# Patient Record
Sex: FEMALE | Race: WHITE | NOT HISPANIC OR LATINO | Employment: OTHER | URBAN - METROPOLITAN AREA
[De-identification: names, ages, dates, MRNs, and addresses within clinical notes are randomized per-mention and may not be internally consistent; named-entity substitution may affect disease eponyms.]

---

## 2017-01-25 ENCOUNTER — HISTORICAL (OUTPATIENT)
Dept: ADMINISTRATIVE | Facility: HOSPITAL | Age: 52
End: 2017-01-25

## 2017-01-25 LAB
B-HCG UR QL: NEGATIVE
BASOPHILS NFR BLD: 0 K/UL (ref 0–0.2)
BASOPHILS NFR BLD: 0.3 %
BUN SERPL-MCNC: 23 MG/DL (ref 8–20)
CALCIUM SERPL-MCNC: 9.2 MG/DL (ref 7.7–10.4)
CHLORIDE: 99 MMOL/L (ref 98–110)
CO2 SERPL-SCNC: 30.4 MMOL/L (ref 22.8–31.6)
CREATININE: 0.74 MG/DL (ref 0.6–1.4)
EOSINOPHIL NFR BLD: 0.1 K/UL (ref 0–0.7)
EOSINOPHIL NFR BLD: 1.1 %
ERYTHROCYTE [DISTWIDTH] IN BLOOD BY AUTOMATED COUNT: 13.7 % (ref 11.7–14.9)
GLUCOSE: 102 MG/DL (ref 70–99)
GRAN #: 5.7 K/UL (ref 1.4–6.5)
GRAN%: 54.6 %
HCT VFR BLD AUTO: 36.6 % (ref 36–48)
HGB BLD-MCNC: 12.1 G/DL (ref 12–15)
IMMATURE GRANS (ABS): 0.1 K/UL (ref 0–1)
IMMATURE GRANULOCYTES: 0.5 %
LYMPH #: 3.8 K/UL (ref 1.2–3.4)
LYMPH%: 36.2 %
MCH RBC QN AUTO: 26.9 PG (ref 25–35)
MCHC RBC AUTO-ENTMCNC: 33.1 G/DL (ref 31–36)
MCV RBC AUTO: 81.3 FL (ref 79–98)
MONO #: 0.8 K/UL (ref 0.1–0.6)
MONO%: 7.3 %
NUCLEATED RBCS: 0 %
PLATELET # BLD AUTO: 383 K/UL (ref 140–440)
PMV BLD AUTO: 9.9 FL (ref 8.8–12.7)
POTASSIUM SERPL-SCNC: 3.1 MMOL/L (ref 3.5–5)
RBC # BLD AUTO: 4.5 M/UL (ref 3.5–5.5)
SODIUM: 139 MMOL/L (ref 134–144)
WBC # BLD AUTO: 10.5 K/UL (ref 5–10)

## 2017-02-13 LAB
CALCIUM SERPL-MCNC: 8.7 MG/DL (ref 7.7–10.4)
GLUCOSE SERPL-MCNC: 106 MG/DL (ref 70–99)
GLUCOSE SERPL-MCNC: 114 MG/DL (ref 70–99)
GLUCOSE SERPL-MCNC: 129 MG/DL (ref 70–99)

## 2017-02-14 LAB
CALCIUM SERPL-MCNC: 8.5 MG/DL (ref 7.7–10.4)
GLUCOSE SERPL-MCNC: 106 MG/DL (ref 70–99)
GLUCOSE SERPL-MCNC: 91 MG/DL (ref 70–99)

## 2018-12-23 ENCOUNTER — CLINICAL SUPPORT (OUTPATIENT)
Dept: URGENT CARE | Facility: CLINIC | Age: 53
End: 2018-12-23
Payer: MEDICAID

## 2018-12-23 VITALS
OXYGEN SATURATION: 94 % | BODY MASS INDEX: 47.88 KG/M2 | WEIGHT: 253.63 LBS | TEMPERATURE: 98 F | HEIGHT: 61 IN | HEART RATE: 64 BPM

## 2018-12-23 DIAGNOSIS — J32.9 SINUSITIS, UNSPECIFIED CHRONICITY, UNSPECIFIED LOCATION: ICD-10-CM

## 2018-12-23 DIAGNOSIS — J40 BRONCHITIS: Primary | ICD-10-CM

## 2018-12-23 DIAGNOSIS — R05.9 COUGH: ICD-10-CM

## 2018-12-23 PROCEDURE — 99204 OFFICE O/P NEW MOD 45 MIN: CPT | Mod: 25,S$GLB,, | Performed by: NURSE PRACTITIONER

## 2018-12-23 RX ORDER — LOVASTATIN 10 MG/1
10 TABLET ORAL NIGHTLY
COMMUNITY
End: 2024-02-27

## 2018-12-23 RX ORDER — BENZONATATE 100 MG/1
200 CAPSULE ORAL 3 TIMES DAILY PRN
Qty: 30 CAPSULE | Refills: 1 | Status: SHIPPED | OUTPATIENT
Start: 2018-12-23 | End: 2019-12-23

## 2018-12-23 RX ORDER — OMEPRAZOLE 40 MG/1
40 CAPSULE, DELAYED RELEASE ORAL DAILY
COMMUNITY
End: 2024-02-27 | Stop reason: SDUPTHER

## 2018-12-23 RX ORDER — NAPROXEN SODIUM 220 MG/1
81 TABLET, FILM COATED ORAL DAILY
COMMUNITY

## 2018-12-23 RX ORDER — MONTELUKAST SODIUM 10 MG/1
10 TABLET ORAL NIGHTLY
COMMUNITY
End: 2024-02-27

## 2018-12-23 RX ORDER — DEXAMETHASONE SODIUM PHOSPHATE 4 MG/ML
8 INJECTION, SOLUTION INTRA-ARTICULAR; INTRALESIONAL; INTRAMUSCULAR; INTRAVENOUS; SOFT TISSUE
Status: COMPLETED | OUTPATIENT
Start: 2018-12-23 | End: 2018-12-23

## 2018-12-23 RX ORDER — FUROSEMIDE 40 MG/1
40 TABLET ORAL 2 TIMES DAILY
COMMUNITY
End: 2024-02-27 | Stop reason: SDUPTHER

## 2018-12-23 RX ORDER — LEVOTHYROXINE SODIUM 112 UG/1
200 TABLET ORAL DAILY
COMMUNITY
End: 2024-02-27 | Stop reason: SDUPTHER

## 2018-12-23 RX ORDER — AZITHROMYCIN 250 MG/1
TABLET, FILM COATED ORAL
Qty: 6 TABLET | Refills: 0 | Status: SHIPPED | OUTPATIENT
Start: 2018-12-23 | End: 2024-02-27

## 2018-12-23 RX ORDER — METOPROLOL TARTRATE 50 MG/1
50 TABLET ORAL 2 TIMES DAILY
COMMUNITY
End: 2024-02-27 | Stop reason: SDUPTHER

## 2018-12-23 RX ORDER — SERTRALINE HYDROCHLORIDE 100 MG/1
100 TABLET, FILM COATED ORAL DAILY
COMMUNITY
End: 2024-02-27 | Stop reason: SDUPTHER

## 2018-12-23 RX ORDER — ERGOCALCIFEROL 1.25 MG/1
50000 CAPSULE ORAL
COMMUNITY

## 2018-12-23 RX ORDER — POTASSIUM CHLORIDE 750 MG/1
10 CAPSULE, EXTENDED RELEASE ORAL ONCE
COMMUNITY
End: 2024-02-27

## 2018-12-23 RX ADMIN — DEXAMETHASONE SODIUM PHOSPHATE 8 MG: 4 INJECTION, SOLUTION INTRA-ARTICULAR; INTRALESIONAL; INTRAMUSCULAR; INTRAVENOUS; SOFT TISSUE at 11:12

## 2018-12-23 NOTE — PROGRESS NOTES
"Subjective:       Patient ID: Vania Montano is a 53 y.o. female.    Vitals:  height is 5' 1" (1.549 m) and weight is 115 kg (253 lb 9.6 oz). Her temperature is 97.7 °F (36.5 °C). Her pulse is 64. Her oxygen saturation is 94 (abnormal)%.     Chief Complaint: Cough    Pt presents with cough with yellow sputum production x 3 days. She denies sob or cp. She also reports PND and nasal congestion,      Cough   This is a recurrent problem. The current episode started in the past 7 days. The problem has been unchanged. The problem occurs constantly. The cough is productive of sputum. Associated symptoms include a sore throat. Pertinent negatives include no chills, ear pain, eye redness, fever, hemoptysis, myalgias, rash, shortness of breath or wheezing.       Constitution: Negative for chills, sweating, fatigue and fever.   HENT: Positive for sinus pain, sinus pressure and sore throat. Negative for ear pain, congestion and voice change.    Neck: Negative for painful lymph nodes.   Eyes: Negative for eye redness.   Respiratory: Positive for cough and sputum production. Negative for chest tightness, bloody sputum, COPD, shortness of breath, stridor, wheezing and asthma.    Gastrointestinal: Negative for nausea and vomiting.   Musculoskeletal: Negative for muscle ache.   Skin: Negative for rash.   Allergic/Immunologic: Negative for seasonal allergies and asthma.   Hematologic/Lymphatic: Negative for swollen lymph nodes.       Objective:      Physical Exam   Constitutional: She is oriented to person, place, and time. She appears well-developed and well-nourished. She is cooperative.  Non-toxic appearance. She does not appear ill. No distress.   HENT:   Head: Normocephalic and atraumatic.   Right Ear: Hearing, tympanic membrane, external ear and ear canal normal.   Left Ear: Hearing, tympanic membrane, external ear and ear canal normal.   Nose: Rhinorrhea present. No mucosal edema or nasal deformity. No epistaxis. Right sinus " exhibits no maxillary sinus tenderness and no frontal sinus tenderness. Left sinus exhibits no maxillary sinus tenderness and no frontal sinus tenderness.   Mouth/Throat: Uvula is midline, oropharynx is clear and moist and mucous membranes are normal. No trismus in the jaw. Normal dentition. No uvula swelling. No posterior oropharyngeal erythema.   Eyes: Conjunctivae and lids are normal. No scleral icterus.   Sclera clear bilat   Neck: Trachea normal, full passive range of motion without pain and phonation normal. Neck supple.   Cardiovascular: Normal rate, regular rhythm, normal heart sounds, intact distal pulses and normal pulses.   Pulmonary/Chest: Effort normal and breath sounds normal. No respiratory distress.   Course BS to bases   Abdominal: Soft. Normal appearance and bowel sounds are normal. She exhibits no distension. There is no tenderness.   Musculoskeletal: Normal range of motion. She exhibits no edema or deformity.   Neurological: She is alert and oriented to person, place, and time. She exhibits normal muscle tone. Coordination normal.   Skin: Skin is warm, dry and intact. She is not diaphoretic. No pallor.   Psychiatric: She has a normal mood and affect. Her speech is normal and behavior is normal. Judgment and thought content normal. Cognition and memory are normal.   Nursing note and vitals reviewed.      Assessment:       1. Bronchitis    2. Cough    3. Sinusitis, unspecified chronicity, unspecified location        Plan:         Bronchitis    Cough    Sinusitis, unspecified chronicity, unspecified location    Other orders  -     dexamethasone injection 8 mg  -     azithromycin (Z-MADIE) 250 MG tablet; Take 2 tablets by mouth on day 1; Take 1 tablet by mouth on days 2-5  Dispense: 6 tablet; Refill: 0  -     benzonatate (TESSALON PERLES) 100 MG capsule; Take 2 capsules (200 mg total) by mouth 3 (three) times daily as needed for Cough.  Dispense: 30 capsule; Refill: 1

## 2018-12-23 NOTE — PATIENT INSTRUCTIONS
What Is Acute Bronchitis?  Acute bronchitis is when the airways in your lungs (bronchial tubes) become red and swollen (inflamed). It is usually caused by a viral infection. But it can also occur because of a bacteria or allergen. Symptoms include a cough that produces yellow or greenish mucus and can last for days or sometimes weeks.  Inside healthy lungs    Air travels in and out of the lungs through the airways. The linings of these airways produce sticky mucus. This mucus traps particles that enter the lungs. Tiny structures called cilia then sweep the particles out of the airways.     Healthy airway: Airways are normally open. Air moves in and out easily.      Healthy cilia: Tiny, hairlike cilia sweep mucus and particles up and out of the airways.   Lungs with bronchitis  Bronchitis often occurs with a cold or the flu virus. The airways become inflamed (red and swollen). There is a deep hacking cough from the extra mucus. Other symptoms may include:  · Wheezing  · Chest discomfort  · Shortness of breath  · Mild fever  A second infection, this time due to bacteria, may then occur. And airways irritated by allergens or smoke are more likely to get infected.        Inflamed airway: Inflammation and extra mucus narrow the airway, causing shortness of breath.      Impaired cilia: Extra mucus impairs cilia, causing congestion and wheezing. Smoking makes the problem worse.   Making a diagnosis  A physical exam, health history, and certain tests help your healthcare provider make the diagnosis.  Health history  Your healthcare provider will ask you about your symptoms.  The exam  Your provider listens to your chest for signs of congestion. He or she may also check your ears, nose, and throat.  Possible tests  · A sputum test for bacteria. This requires a sample of mucus from your lungs.  · A nasal or throat swab. This tests to see if you have a bacterial infection.  · A chest X-ray. This is done if your healthcare  provider thinks you have pneumonia.  · Tests to check for an underlying condition. Other tests may be done to check for things such as allergies, asthma, or COPD (chronic obstructive pulmonary disease). You may need to see a specialist for more lung function testing.  Treating a cough  The main treatment for bronchitis is easing symptoms. Avoiding smoke, allergens, and other things that trigger coughing can often help. If the infection is bacterial, you may be given antibiotics. During the illness, it's important to get plenty of sleep. To ease symptoms:  · Dont smoke. Also avoid secondhand smoke.  · Use a humidifier. Or try breathing in steam from a hot shower. This may help loosen mucus.  · Drink a lot of water and juice. They can soothe the throat and may help thin mucus.  · Sit up or use extra pillows when in bed. This helps to lessen coughing and congestion.  · Ask your provider about using medicine. Ask about using cough medicine, pain and fever medicine, or a decongestant.  Antibiotics  Most cases of bronchitis are caused by cold or flu viruses. They dont need antibiotics to treat them, even if your mucus is thick and green or yellow. Antibiotics dont treat viral illness and antibiotics have not been shown to have any benefit in cases of acute bronchitis. Taking antibiotics when they are not needed increases your risk of getting an infection later that is antibiotic-resistant. Antibiotics can also cause severe cases of diarrhea that require other antibiotics to treat.  It is important that you accept your healthcare provider's opinion to not use antibiotics. Your provider will prescribe antibiotics if the infection is caused by bacteria. If they are prescribed:  · Take all of the medicine. Take the medicine until it is used up, even if symptoms have improved. If you dont, the bronchitis may come back.  · Take the medicines as directed. For instance, some medicines should be taken with food.  · Ask about  side effects. Ask your provider or pharmacist what side effects are common, and what to do about them.  Follow-up care  You should see your provider again in 2 to 3 weeks. By this time, symptoms should have improved. An infection that lasts longer may mean you have a more serious problem.  Prevention  · Avoid tobacco smoke. If you smoke, quit. Stay away from smoky places. Ask friends and family not to smoke around you, or in your home or car.  · Get checked for allergies.  · Ask your provider about getting a yearly flu shot. Also ask about pneumococcal or pneumonia shots.  · Wash your hands often. This helps reduce the chance of picking up viruses that cause colds and flu.  Call your healthcare provider if:  · Symptoms worsen, or you have new symptoms  · Breathing problems worsen or  become severe  · Symptoms dont get better within a week, or within 3 days of taking antibiotics   Date Last Reviewed: 2/1/2017  © 3870-2470 [employer]. [address] [place] [address]. All rights reserved. This information is not intended as a substitute for professional medical care. Always follow your healthcare professional's instructions.        Sinusitis (Antibiotic Treatment)    The sinuses are air-filled spaces within the bones of the face. They connect to the inside of the nose. Sinusitis is an inflammation of the tissue lining the sinus cavity. Sinus inflammation can occur during a cold. It can also be due to allergies to pollens and other particles in the air. Sinusitis can cause symptoms of sinus congestion and fullness. A sinus infection causes fever, headache and facial pain. There is often green or yellow drainage from the nose or into the back of the throat (post-nasal drip). You have been given antibiotics to treat this condition.  Home care:  · Take the full course of antibiotics as instructed. Do not stop taking them, even if you feel better.  · Drink plenty of water, hot tea, and other  liquids. This may help thin mucus. It also may promote sinus drainage.  · Heat may help soothe painful areas of the face. Use a towel soaked in hot water. Or,  the shower and direct the hot spray onto your face. Using a vaporizer along with a menthol rub at night may also help.   · An expectorant containing guaifenesin may help thin the mucus and promote drainage from the sinuses.  · Over-the-counter decongestants may be used unless a similar medicine was prescribed. Nasal sprays work the fastest. Use one that contains phenylephrine or oxymetazoline. First blow the nose gently. Then use the spray. Do not use these medicines more often than directed on the label or symptoms may get worse. You may also use tablets containing pseudoephedrine. Avoid products that combine ingredients, because side effects may be increased. Read labels. You can also ask the pharmacist for help. (NOTE: Persons with high blood pressure should not use decongestants. They can raise blood pressure.)  · Over-the-counter antihistamines may help if allergies contributed to your sinusitis.    · Do not use nasal rinses or irrigation during an acute sinus infection, unless told to by your health care provider. Rinsing may spread the infection to other sinuses.  · Use acetaminophen or ibuprofen to control pain, unless another pain medicine was prescribed. (If you have chronic liver or kidney disease or ever had a stomach ulcer, talk with your doctor before using these medicines. Aspirin should never be used in anyone under 18 years of age who is ill with a fever. It may cause severe liver damage.)  · Don't smoke. This can worsen symptoms.  Follow-up care  Follow up with your healthcare provider or our staff if you are not improving within the next week.  When to seek medical advice  Call your healthcare provider if any of these occur:  · Facial pain or headache becoming more severe  · Stiff neck  · Unusual drowsiness or confusion  · Swelling  of the forehead or eyelids  · Vision problems, including blurred or double vision  · Fever of 100.4ºF (38ºC) or higher, or as directed by your healthcare provider  · Seizure  · Breathing problems  · Symptoms not resolving within 10 days  Date Last Reviewed: 4/13/2015  © [address]. [address] [place] [address], [place] [address]. All rights reserved. This information is not intended as a substitute for professional medical care. Always follow your healthcare professional's instructions.

## 2018-12-28 ENCOUNTER — OFFICE VISIT (OUTPATIENT)
Dept: URGENT CARE | Facility: CLINIC | Age: 53
End: 2018-12-28
Payer: MEDICAID

## 2018-12-28 VITALS
WEIGHT: 248 LBS | HEIGHT: 61 IN | RESPIRATION RATE: 12 BRPM | DIASTOLIC BLOOD PRESSURE: 83 MMHG | TEMPERATURE: 99 F | OXYGEN SATURATION: 98 % | BODY MASS INDEX: 46.82 KG/M2 | SYSTOLIC BLOOD PRESSURE: 137 MMHG | HEART RATE: 63 BPM

## 2018-12-28 DIAGNOSIS — J40 BRONCHITIS: Primary | ICD-10-CM

## 2018-12-28 PROCEDURE — 99214 OFFICE O/P EST MOD 30 MIN: CPT | Mod: S$GLB,,, | Performed by: NURSE PRACTITIONER

## 2018-12-28 RX ORDER — ALBUTEROL SULFATE 0.83 MG/ML
2.5 SOLUTION RESPIRATORY (INHALATION) EVERY 6 HOURS PRN
Qty: 1 BOX | Refills: 0 | Status: SHIPPED | OUTPATIENT
Start: 2018-12-28 | End: 2019-12-28

## 2018-12-28 RX ORDER — PREDNISONE 20 MG/1
20 TABLET ORAL 2 TIMES DAILY
Qty: 10 TABLET | Refills: 0 | Status: SHIPPED | OUTPATIENT
Start: 2018-12-28 | End: 2019-01-02

## 2018-12-28 RX ORDER — FLUTICASONE PROPIONATE 50 MCG
2 SPRAY, SUSPENSION (ML) NASAL 2 TIMES DAILY
Qty: 1 BOTTLE | Refills: 0 | Status: SHIPPED | OUTPATIENT
Start: 2018-12-28 | End: 2024-02-27

## 2018-12-28 RX ORDER — CETIRIZINE HYDROCHLORIDE 10 MG/1
10 TABLET ORAL DAILY
Qty: 30 TABLET | Refills: 2 | Status: SHIPPED | OUTPATIENT
Start: 2018-12-28 | End: 2024-02-27

## 2018-12-28 RX ORDER — ALBUTEROL SULFATE 90 UG/1
2 AEROSOL, METERED RESPIRATORY (INHALATION) EVERY 6 HOURS PRN
Qty: 18 G | Refills: 0 | Status: SHIPPED | OUTPATIENT
Start: 2018-12-28 | End: 2024-02-27 | Stop reason: SDUPTHER

## 2018-12-28 RX ORDER — PROMETHAZINE HYDROCHLORIDE AND DEXTROMETHORPHAN HYDROBROMIDE 6.25; 15 MG/5ML; MG/5ML
5 SYRUP ORAL EVERY 4 HOURS PRN
Qty: 240 ML | Refills: 0 | Status: SHIPPED | OUTPATIENT
Start: 2018-12-28 | End: 2019-01-07

## 2018-12-28 NOTE — PROGRESS NOTES
"Subjective:       Patient ID: Vania Montano is a 53 y.o. female.    Vitals:  height is 5' 1" (1.549 m) and weight is 112.5 kg (248 lb). Her temperature is 99.3 °F (37.4 °C). Her blood pressure is 137/83 and her pulse is 63. Her respiration is 12 and oxygen saturation is 98%.     Chief Complaint: Follow-up    Seen on 12/23/18 states not any better cough is worse. Was placed on antibiotics and given decadron injection then, states feels she is getting worse. Denies fever/chills    Cough   This is a new problem. The current episode started 1 to 4 weeks ago. The problem has been gradually worsening. The problem occurs every few minutes. The cough is non-productive. Associated symptoms include postnasal drip, a sore throat and wheezing. Pertinent negatives include no chest pain, chills, fever, headaches, myalgias, rash or shortness of breath. Nothing aggravates the symptoms. She has tried OTC cough suppressant and prescription cough suppressant for the symptoms. The treatment provided no relief.       Constitution: Positive for fatigue. Negative for chills and fever.   HENT: Positive for congestion, postnasal drip and sore throat.    Neck: Negative for painful lymph nodes.   Cardiovascular: Negative for chest pain and leg swelling.   Eyes: Negative for double vision and blurred vision.   Respiratory: Positive for cough and wheezing. Negative for shortness of breath.    Gastrointestinal: Negative for nausea, vomiting and diarrhea.   Genitourinary: Negative for dysuria, frequency, urgency and history of kidney stones.   Musculoskeletal: Negative for joint pain, joint swelling, muscle cramps and muscle ache.   Skin: Negative for color change, pale, rash and bruising.   Allergic/Immunologic: Negative for seasonal allergies.   Neurological: Negative for dizziness, history of vertigo, light-headedness, passing out and headaches.   Hematologic/Lymphatic: Negative for swollen lymph nodes.   Psychiatric/Behavioral: Negative for " nervous/anxious, sleep disturbance and depression. The patient is not nervous/anxious.        Objective:      Physical Exam   Constitutional: She is oriented to person, place, and time. Vital signs are normal. She appears well-developed and well-nourished. She is cooperative.   HENT:   Head: Normocephalic.   Right Ear: Hearing, external ear and ear canal normal. A middle ear effusion is present.   Left Ear: Hearing, external ear and ear canal normal. A middle ear effusion is present.   Nose: Mucosal edema and rhinorrhea present. Right sinus exhibits maxillary sinus tenderness. Left sinus exhibits maxillary sinus tenderness.   Mouth/Throat: Uvula is midline and mucous membranes are normal. Posterior oropharyngeal erythema present.   Eyes: Conjunctivae, EOM and lids are normal. Pupils are equal, round, and reactive to light.   Neck: Trachea normal, normal range of motion, full passive range of motion without pain and phonation normal. Neck supple.   Cardiovascular: Normal rate, regular rhythm, normal heart sounds, intact distal pulses and normal pulses.   Pulmonary/Chest: Effort normal and breath sounds normal.   Abdominal: Soft. Normal appearance, normal aorta and bowel sounds are normal. There is no tenderness.   Musculoskeletal: Normal range of motion.   Neurological: She is alert and oriented to person, place, and time. She has normal strength. GCS eye subscore is 4. GCS verbal subscore is 5. GCS motor subscore is 6.   Skin: Skin is warm, dry and intact. Capillary refill takes less than 2 seconds.   Psychiatric: She has a normal mood and affect. Her speech is normal and behavior is normal. Judgment and thought content normal. Cognition and memory are normal.       Assessment:       1. Bronchitis        Plan:     will augment previous treatment with albuterol per bronchitis guidelines as well as symptomatic care. Pt requested more antibiotics but do not feel she will need them at this time based on presentation and  appearance of no distress    Bronchitis  -     NEBULIZER FOR HOME USE  -     predniSONE (DELTASONE) 20 MG tablet; Take 1 tablet (20 mg total) by mouth 2 (two) times daily. for 5 days  Dispense: 10 tablet; Refill: 0  -     promethazine-dextromethorphan (PROMETHAZINE-DM) 6.25-15 mg/5 mL Syrp; Take 5 mLs by mouth every 4 (four) hours as needed.  Dispense: 240 mL; Refill: 0  -     albuterol (PROVENTIL) 2.5 mg /3 mL (0.083 %) nebulizer solution; Take 3 mLs (2.5 mg total) by nebulization every 6 (six) hours as needed for Wheezing. Rescue  Dispense: 1 Box; Refill: 0  -     albuterol (VENTOLIN HFA) 90 mcg/actuation inhaler; Inhale 2 puffs into the lungs every 6 (six) hours as needed for Wheezing. Rescue  Dispense: 18 g; Refill: 0  -     cetirizine (ZYRTEC) 10 MG tablet; Take 1 tablet (10 mg total) by mouth once daily.  Dispense: 30 tablet; Refill: 2  -     fluticasone (FLONASE) 50 mcg/actuation nasal spray; 2 sprays (100 mcg total) by Each Nare route 2 (two) times daily.  Dispense: 1 Bottle; Refill: 0  -     NEBULIZER KIT (SUPPLIES) FOR HOME USE

## 2019-12-26 ENCOUNTER — CLINICAL SUPPORT (OUTPATIENT)
Dept: URGENT CARE | Facility: CLINIC | Age: 54
End: 2019-12-26
Payer: MEDICAID

## 2019-12-26 VITALS
HEART RATE: 63 BPM | OXYGEN SATURATION: 95 % | RESPIRATION RATE: 18 BRPM | TEMPERATURE: 97 F | BODY MASS INDEX: 42.49 KG/M2 | DIASTOLIC BLOOD PRESSURE: 67 MMHG | HEIGHT: 65 IN | WEIGHT: 255 LBS | SYSTOLIC BLOOD PRESSURE: 133 MMHG

## 2019-12-26 DIAGNOSIS — J40 BRONCHITIS: ICD-10-CM

## 2019-12-26 DIAGNOSIS — J32.9 SINUSITIS, UNSPECIFIED CHRONICITY, UNSPECIFIED LOCATION: Primary | ICD-10-CM

## 2019-12-26 PROCEDURE — 99214 OFFICE O/P EST MOD 30 MIN: CPT | Mod: 25,S$GLB,, | Performed by: EMERGENCY MEDICINE

## 2019-12-26 PROCEDURE — 99214 PR OFFICE/OUTPT VISIT, EST, LEVL IV, 30-39 MIN: ICD-10-PCS | Mod: 25,S$GLB,, | Performed by: EMERGENCY MEDICINE

## 2019-12-26 RX ORDER — BENZONATATE 100 MG/1
200 CAPSULE ORAL 3 TIMES DAILY PRN
Qty: 30 CAPSULE | Refills: 1 | Status: SHIPPED | OUTPATIENT
Start: 2019-12-26 | End: 2020-01-25

## 2019-12-26 RX ORDER — CEFTRIAXONE 1 G/1
1 INJECTION, POWDER, FOR SOLUTION INTRAMUSCULAR; INTRAVENOUS
Status: COMPLETED | OUTPATIENT
Start: 2019-12-26 | End: 2019-12-26

## 2019-12-26 RX ORDER — DEXAMETHASONE SODIUM PHOSPHATE 4 MG/ML
8 INJECTION, SOLUTION INTRA-ARTICULAR; INTRALESIONAL; INTRAMUSCULAR; INTRAVENOUS; SOFT TISSUE ONCE
Status: COMPLETED | OUTPATIENT
Start: 2019-12-26 | End: 2019-12-26

## 2019-12-26 RX ORDER — AMOXICILLIN AND CLAVULANATE POTASSIUM 875; 125 MG/1; MG/1
1 TABLET, FILM COATED ORAL 2 TIMES DAILY
Qty: 14 TABLET | Refills: 0 | Status: SHIPPED | OUTPATIENT
Start: 2019-12-26 | End: 2024-02-27

## 2019-12-26 RX ADMIN — CEFTRIAXONE 1 G: 1 INJECTION, POWDER, FOR SOLUTION INTRAMUSCULAR; INTRAVENOUS at 01:12

## 2019-12-26 RX ADMIN — DEXAMETHASONE SODIUM PHOSPHATE 8 MG: 4 INJECTION, SOLUTION INTRA-ARTICULAR; INTRALESIONAL; INTRAMUSCULAR; INTRAVENOUS; SOFT TISSUE at 01:12

## 2019-12-26 NOTE — PROGRESS NOTES
"Subjective:       Patient ID: Vania Montano is a 54 y.o. female.    Vitals:  height is 5' 5" (1.651 m) and weight is 115.7 kg (255 lb). Her temperature is 97.4 °F (36.3 °C). Her blood pressure is 133/67 and her pulse is 63. Her respiration is 18 and oxygen saturation is 95%.     Chief Complaint: Sinus Problem    Sinus Problem   Episode onset: sunday  Associated symptoms include congestion, coughing, sinus pressure and a sore throat. (Scratchy throat ) Treatments tried: otc cough med. The treatment provided no relief.       HENT: Positive for congestion, postnasal drip, sinus pain, sinus pressure and sore throat.    Respiratory: Positive for cough.        Objective:      Physical Exam   Constitutional: She is oriented to person, place, and time. She appears well-developed and well-nourished. She is cooperative.  Non-toxic appearance. She does not have a sickly appearance. She does not appear ill. No distress.   HENT:   Head: Normocephalic and atraumatic.   Right Ear: Hearing, tympanic membrane, external ear and ear canal normal.   Left Ear: Hearing, tympanic membrane, external ear and ear canal normal.   Nose: Nose normal. No mucosal edema, rhinorrhea or nasal deformity. No epistaxis. Right sinus exhibits no maxillary sinus tenderness and no frontal sinus tenderness. Left sinus exhibits no maxillary sinus tenderness and no frontal sinus tenderness.   Mouth/Throat: Uvula is midline and mucous membranes are normal. No trismus in the jaw. Normal dentition. No uvula swelling. Oropharyngeal exudate present. No posterior oropharyngeal edema or posterior oropharyngeal erythema.   Eyes: Conjunctivae and lids are normal. No scleral icterus.   Neck: Trachea normal, full passive range of motion without pain and phonation normal. Neck supple. No neck rigidity. No edema and no erythema present.   Cardiovascular: Normal rate, regular rhythm, normal heart sounds, intact distal pulses and normal pulses.   Pulmonary/Chest: Effort " normal and breath sounds normal. No respiratory distress. She has no decreased breath sounds. She has no rhonchi.   Abdominal: Normal appearance.   Musculoskeletal: Normal range of motion. She exhibits no edema or deformity.   Neurological: She is alert and oriented to person, place, and time. She exhibits normal muscle tone. Coordination normal.   Skin: Skin is warm, dry, intact, not diaphoretic and not pale.   Psychiatric: She has a normal mood and affect. Her speech is normal and behavior is normal. Judgment and thought content normal. Cognition and memory are normal.   Nursing note and vitals reviewed.        Assessment:       1. Sinusitis, unspecified chronicity, unspecified location    2. Bronchitis        Plan:         Sinusitis, unspecified chronicity, unspecified location    Bronchitis    Other orders  -     dexamethasone injection 8 mg  -     cefTRIAXone injection 1 g  -     benzonatate (TESSALON PERLES) 100 MG capsule; Take 2 capsules (200 mg total) by mouth 3 (three) times daily as needed for Cough.  Dispense: 30 capsule; Refill: 1  -     amoxicillin-clavulanate 875-125mg (AUGMENTIN) 875-125 mg per tablet; Take 1 tablet by mouth 2 (two) times daily.  Dispense: 14 tablet; Refill: 0

## 2020-09-24 PROBLEM — Z86.0100 PERSONAL HISTORY OF COLONIC POLYPS: Status: ACTIVE | Noted: 2020-09-24

## 2020-09-24 PROBLEM — Z86.010 PERSONAL HISTORY OF COLONIC POLYPS: Status: ACTIVE | Noted: 2020-09-24

## 2024-02-20 ENCOUNTER — TELEPHONE (OUTPATIENT)
Dept: FAMILY MEDICINE | Facility: CLINIC | Age: 59
End: 2024-02-20
Payer: MEDICARE

## 2024-02-20 NOTE — TELEPHONE ENCOUNTER
----- Message from Karely Reaves sent at 2/20/2024  2:37 PM CST -----  Contact: Mom Digna  Type:  Sooner Appointment Request    Caller is requesting a sooner appointment.  Caller declined first available appointment listed below.  Caller will not accept being placed on the waitlist and is requesting a message be sent to doctor.    Name of Caller:  Patient  When is the first available appointment?  04/01  Symptoms:  Needs to est care /Medication refills  Would the patient rather a call back or a response via [facility]? Call back  Best Call Back Number:  [phone]  Additional Information:  Pts provider no longer accepts her insurance and she is needing a female provider to establish care with to manage her medications. Can we please call pt back to assist since she is out of a few. Thank You.

## 2024-02-27 ENCOUNTER — HOSPITAL ENCOUNTER (OUTPATIENT)
Dept: RADIOLOGY | Facility: CLINIC | Age: 59
Discharge: HOME | End: 2024-02-27
Attending: STUDENT IN AN ORGANIZED HEALTH CARE EDUCATION/TRAINING PROGRAM
Payer: MEDICARE

## 2024-02-27 ENCOUNTER — OFFICE VISIT (OUTPATIENT)
Dept: FAMILY MEDICINE | Facility: CLINIC | Age: 59
End: 2024-02-27
Payer: MEDICARE

## 2024-02-27 VITALS
DIASTOLIC BLOOD PRESSURE: 80 MMHG | WEIGHT: 257.5 LBS | BODY MASS INDEX: 42.9 KG/M2 | OXYGEN SATURATION: 98 % | HEART RATE: 62 BPM | TEMPERATURE: 98 F | HEIGHT: 65 IN | SYSTOLIC BLOOD PRESSURE: 124 MMHG

## 2024-02-27 DIAGNOSIS — Z87.39 H/O: GOUT: ICD-10-CM

## 2024-02-27 DIAGNOSIS — F32.A ANXIETY AND DEPRESSION: ICD-10-CM

## 2024-02-27 DIAGNOSIS — Z76.89 ENCOUNTER TO ESTABLISH CARE: Primary | ICD-10-CM

## 2024-02-27 DIAGNOSIS — F41.9 ANXIETY AND DEPRESSION: ICD-10-CM

## 2024-02-27 DIAGNOSIS — E66.01 CLASS 3 SEVERE OBESITY DUE TO EXCESS CALORIES WITH SERIOUS COMORBIDITY AND BODY MASS INDEX (BMI) OF 40.0 TO 44.9 IN ADULT: ICD-10-CM
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
## 2024-02-27 DIAGNOSIS — R79.9 ABNORMAL FINDING OF BLOOD CHEMISTRY, UNSPECIFIED: ICD-10-CM

## 2024-02-27 DIAGNOSIS — I10 ESSENTIAL HYPERTENSION: ICD-10-CM

## 2024-02-27 DIAGNOSIS — M54.50 ACUTE LEFT-SIDED LOW BACK PAIN WITHOUT SCIATICA: ICD-10-CM
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
## 2024-02-27 DIAGNOSIS — Z11.4 ENCOUNTER FOR SCREENING FOR HIV: ICD-10-CM

## 2024-02-27 DIAGNOSIS — Z11.59 ENCOUNTER FOR HEPATITIS C SCREENING TEST FOR LOW RISK PATIENT: ICD-10-CM

## 2024-02-27 DIAGNOSIS — J45.20 MILD INTERMITTENT ASTHMA WITHOUT COMPLICATION: ICD-10-CM

## 2024-02-27 DIAGNOSIS — M25.562 CHRONIC PAIN OF BOTH KNEES: ICD-10-CM

## 2024-02-27 DIAGNOSIS — M25.561 CHRONIC PAIN OF BOTH KNEES: ICD-10-CM

## 2024-02-27 DIAGNOSIS — K21.9 GASTROESOPHAGEAL REFLUX DISEASE WITHOUT ESOPHAGITIS: ICD-10-CM

## 2024-02-27 DIAGNOSIS — E89.0 POSTOPERATIVE HYPOTHYROIDISM: ICD-10-CM

## 2024-02-27 DIAGNOSIS — G89.29 CHRONIC PAIN OF BOTH KNEES: ICD-10-CM

## 2024-02-27 DIAGNOSIS — R60.0 PEDAL EDEMA: ICD-10-CM

## 2024-02-27 DIAGNOSIS — Z13.220 SCREENING CHOLESTEROL LEVEL: ICD-10-CM

## 2024-02-27 DIAGNOSIS — Z12.31 ENCOUNTER FOR SCREENING MAMMOGRAM FOR MALIGNANT NEOPLASM OF BREAST: ICD-10-CM

## 2024-02-27 PROCEDURE — 72100 X-RAY EXAM L-S SPINE 2/3 VWS: CPT | Mod: TC,FY,PO

## 2024-02-27 PROCEDURE — 72100 X-RAY EXAM L-S SPINE 2/3 VWS: CPT | Mod: 26,,, | Performed by: RADIOLOGY

## 2024-02-27 PROCEDURE — 99999 PR PBB SHADOW E&M-EST. PATIENT-LVL IV: CPT | Mod: PBBFAC,,, | Performed by: STUDENT IN AN ORGANIZED HEALTH CARE EDUCATION/TRAINING PROGRAM

## 2024-02-27 PROCEDURE — 99214 OFFICE O/P EST MOD 30 MIN: CPT | Mod: PBBFAC,PO,25 | Performed by: STUDENT IN AN ORGANIZED HEALTH CARE EDUCATION/TRAINING PROGRAM

## 2024-02-27 PROCEDURE — 99204 OFFICE O/P NEW MOD 45 MIN: CPT | Mod: S$PBB,,, | Performed by: STUDENT IN AN ORGANIZED HEALTH CARE EDUCATION/TRAINING PROGRAM

## 2024-02-27 RX ORDER — METOPROLOL TARTRATE 50 MG/1
50 TABLET ORAL 2 TIMES DAILY
Qty: 180 TABLET | Refills: 3 | Status: SHIPPED | OUTPATIENT
Start: 2024-02-27 | End: 2025-02-26

## 2024-02-27 RX ORDER — LEVOTHYROXINE SODIUM 112 UG/1
200 TABLET ORAL DAILY
Qty: 180 TABLET | Refills: 3 | Status: SHIPPED | OUTPATIENT
Start: 2024-02-27 | End: 2025-02-26

## 2024-02-27 RX ORDER — ROSUVASTATIN CALCIUM 20 MG/1
20 TABLET, COATED ORAL DAILY
COMMUNITY
End: 2024-02-27 | Stop reason: SDUPTHER

## 2024-02-27 RX ORDER — SERTRALINE HYDROCHLORIDE 100 MG/1
100 TABLET, FILM COATED ORAL DAILY
Qty: 90 TABLET | Refills: 3 | Status: SHIPPED | OUTPATIENT
Start: 2024-02-27 | End: 2025-02-26

## 2024-02-27 RX ORDER — ALLOPURINOL 100 MG/1
100 TABLET ORAL
COMMUNITY
Start: 2024-01-12 | End: 2024-02-27 | Stop reason: SDUPTHER

## 2024-02-27 RX ORDER — FUROSEMIDE 40 MG/1
40 TABLET ORAL 2 TIMES DAILY
Qty: 180 TABLET | Refills: 3 | Status: SHIPPED | OUTPATIENT
Start: 2024-02-27 | End: 2025-02-26

## 2024-02-27 RX ORDER — LIOTHYRONINE SODIUM 5 UG/1
5 TABLET ORAL DAILY
Qty: 90 TABLET | Refills: 3 | Status: SHIPPED | OUTPATIENT
Start: 2024-02-27 | End: 2025-02-26

## 2024-02-27 RX ORDER — OMEPRAZOLE 20 MG/1
20 CAPSULE, DELAYED RELEASE ORAL DAILY
Qty: 90 CAPSULE | Refills: 3 | Status: SHIPPED | OUTPATIENT
Start: 2024-02-27 | End: 2025-02-26

## 2024-02-27 RX ORDER — ROSUVASTATIN CALCIUM 20 MG/1
20 TABLET, COATED ORAL DAILY
Qty: 90 TABLET | Refills: 3 | Status: SHIPPED | OUTPATIENT
Start: 2024-02-27 | End: 2025-02-26

## 2024-02-27 RX ORDER — ALBUTEROL SULFATE 90 UG/1
2 AEROSOL, METERED RESPIRATORY (INHALATION) EVERY 6 HOURS PRN
Qty: 18 G | Refills: 0 | Status: SHIPPED | OUTPATIENT
Start: 2024-02-27 | End: 2025-02-26

## 2024-02-27 RX ORDER — ALLOPURINOL 100 MG/1
100 TABLET ORAL DAILY
Qty: 90 TABLET | Refills: 3 | Status: SHIPPED | OUTPATIENT
Start: 2024-02-27 | End: 2025-02-26

## 2024-02-27 RX ORDER — LIOTHYRONINE SODIUM 5 UG/1
5 TABLET ORAL
COMMUNITY
Start: 2024-01-12 | End: 2024-02-27 | Stop reason: SDUPTHER

## 2024-02-27 NOTE — PATIENT INSTRUCTIONS
Vasyl Caro,     If you are due for any health screening(s) below please notify me so we can arrange them to be ordered and scheduled. Most healthy patients at your age complete them, but you are free to accept or refuse.     If you can't do it, I'll definitely understand. If you can, I'd certainly appreciate it!    Tests to Keep You Healthy    Mammogram: DUE  Colon Cancer Screening: Met on 9/24/2020  Cervical Cancer Screening: DUE      Schedule your breast cancer screening today     Breast cancer is the second most common cancer in women,  and the second leading cause of death from cancer. Mammograms can detect breast cancer early, which significantly increases the chances of curing the cancer.       Our records indicate that you may be overdue for breast cancer screening. Cancer screenings save lives, so schedule yours today to stay healthy.     If you recently had a mammogram performed outside of [facility], please let your Health care team know so that they can update your health record.        Your cervical cancer screening is due     Our records indicate that you may be overdue for your screening Pap smear. A Pap smear is an important health screening that can detect abnormal cells that can become cervical cancer. Cervical cancer screenings allow for early diagnosis and increase the likelihood of successful treatment.     The current recommendation for Pap smear screening is every 3-5 years for women at average risk. We encourage you to schedule your appointment with your womens health provider. Many women see a gynecologist for this screening, but some primary care providers also provide Pap screening.     If you recently had your Pap smear screening performed outside of [facility], please let your health care team know so that they can update your health record.

## 2024-02-27 NOTE — PROGRESS NOTES
[facility] Note    Subjective:  Chief Complaint:   Chief Complaint   Patient presents with    Establish Care       History of Present Illness:  Vania is here for establishing care and acute problem   Here with mother     Back pain s/p fall  Length: pain is acute pain. Length > 4 day(s)  Any past, recent injury, falls: yes, in the tub  Intensity:  AVERAGE  Pain  8/10.   Location: left side lumbar region   Radiation: Negative to leg.   Timing : constant pain   QUALITY:  sharp/  Negative leg weakness.   Worsening factors: moving   Alleviating factors: none  Current medications: none  Failed medications: n/a  Prior procedures. none  Imaging: none  PT/OT: none    Patient denies weight loss, fever, chills, drenching night sweats, ripping or tearing sensation, dysuria or flank pain, hx of malignancy,  tuberculosis, immunosuppression,  recent infections,  renal stones,  hx of aortic pathology, IVDU. Reports no bladder/bowel incontinence, no saddle anesthesia, and no leg paresthesias.    B/l knee pain, chronic   Right knee meniscus repair 6-7 years ago   Mother reports she walks 'stiff legged' and avoids bending   Denies recent injury   Denies h/o injections or PT     H/o gout, 2 outbreaks in her feet   Allopurinol 100     Pedal edema  Furoseide 40 daily, bid prn     Hypothyroid post op s/p thyroid ca  Levothyroxine 112   Liothyronine 5    HTN  Metoprolol 50     HLD  Rosuvastatin 20     Reflux  Omeprazole 40   Failed discontinuation    Anxiety/depression   Sertraline 100    Mentally delayed   Depends on mother for ADLs    Asthma  Albuterol prn   Worse in cold weather or if uri       Mammo last > 1 year ago, due  PAP reports difficulty with tolerating procedure   Recommend Shingles vaccinations.     Screening  Health Maintenance         Date Due Completion Date    Hepatitis C Screening Never done ---    Cervical Cancer Screening Never done ---    Lipid Panel Never done ---    HIV Screening Never done ---     Mammogram Never done ---    Hemoglobin A1c (Diabetic Prevention Screening) Never done ---    Shingles Vaccine (2 of 2) 12/01/2022 10/6/2022    Colorectal Cancer Screening 09/24/2030 9/24/2020    TETANUS VACCINE 10/06/2032 10/6/2022            There is no immunization history on file for this patient.  The ASCVD Risk score (Ry SALAZAR, et al., 2019) failed to calculate for the following reasons:    Cannot find a previous HDL lab    Cannot find a previous total cholesterol lab    Allergies:  Review of patient's allergies indicates:  No Known Allergies    Home Medications:  Current Outpatient Medications on File Prior to Visit   Medication Sig    aspirin 81 MG Chew Take 81 mg by mouth once daily.    ergocalciferol (VITAMIN D2) 50,000 unit Cap Take 50,000 Units by mouth every 7 days.    [DISCONTINUED] albuterol (VENTOLIN HFA) 90 mcg/actuation inhaler Inhale 2 puffs into the lungs every 6 (six) hours as needed for Wheezing. Rescue    [DISCONTINUED] allopurinoL (ZYLOPRIM) 100 MG tablet Take 100 mg by mouth.    [DISCONTINUED] furosemide (LASIX) 40 MG tablet Take 40 mg by mouth 2 (two) times daily.    [DISCONTINUED] levothyroxine (SYNTHROID) 112 MCG tablet Take 200 mcg by mouth once daily.    [DISCONTINUED] liothyronine (CYTOMEL) 5 MCG Tab Take 5 mcg by mouth.    [DISCONTINUED] metoprolol tartrate (LOPRESSOR) 50 MG tablet Take 50 mg by mouth 2 (two) times daily.    [DISCONTINUED] omeprazole (PRILOSEC) 40 MG capsule Take 40 mg by mouth once daily.    [DISCONTINUED] rosuvastatin (CRESTOR) 20 MG tablet Take 20 mg by mouth once daily.    [DISCONTINUED] sertraline (ZOLOFT) 100 MG tablet Take 100 mg by mouth once daily.    [DISCONTINUED] amoxicillin-clavulanate 875-125mg (AUGMENTIN) 875-125 mg per tablet Take 1 tablet by mouth 2 (two) times daily.    [DISCONTINUED] azithromycin (Z-MADIE) 250 MG tablet Take 2 tablets by mouth on day 1; Take 1 tablet by mouth on days 2-5 (Patient not taking: Reported on 12/26/2019)    [DISCONTINUED]  cetirizine (ZYRTEC) 10 MG tablet Take 1 tablet (10 mg total) by mouth once daily.    [DISCONTINUED] fluticasone (FLONASE) 50 mcg/actuation nasal spray 2 sprays (100 mcg total) by Each Nare route 2 (two) times daily.    [DISCONTINUED] lovastatin (MEVACOR) 10 MG tablet Take 10 mg by mouth every evening.    [DISCONTINUED] montelukast (SINGULAIR) 10 mg tablet Take 10 mg by mouth every evening.    [DISCONTINUED] potassium chloride (MICRO-K) 10 MEQ CpSR Take 10 mEq by mouth once.     No current facility-administered medications on file prior to visit.       Past Medical History:   Diagnosis Date    GERD (gastroesophageal reflux disease)     Hypertension     Thyroid disease      Past Surgical History:   Procedure Laterality Date    CHOLECYSTECTOMY      COLONOSCOPY N/A 9/24/2020    Procedure: COLONOSCOPY;  Surgeon: Akash Leach MD;  Location: [facility];  Service: Endoscopy;  Laterality: N/A;    KNEE ARTHROSCOPY Right      Family History   Problem Relation Age of Onset    No Known Problems Mother     No Known Problems Father      Social History     Tobacco Use    Smoking status: Never    Smokeless tobacco: Never            The patient's past medical history, surgical history, social history, family history, allergies and medications have been reviewed.    Review of Systems     10 point review of systems was conducted and only the pertinent positives and pertinent negatives are noted above in the HPI section.    Physical Examination  General appearance: alert, cooperative, no distress, ambulates with cane , ataxic gait   HEENT: normocephalic, atraumatic, PERRLA   Neck: trachea midline, no LAD, no thyromegaly, no neck stiffness  Lungs: clear to auscultation, no wheezes, rales or rhonchi, symmetric air entry  Heart: normal rate, regular rhythm, normal S1, S2, no murmurs, rubs, clicks or gallops  Abdomen: soft, nontender, nondistended, no rigidity, rebound, or guarding.   Back: no point tenderness over spine, large  "ecchymosis to left-side lateral lumbar region   Skin: No rashes or abnormal skin lesions, no apparent jaundice   Extremities: peripheral pulses normal, no unilateral leg swelling or calf tenderness, decreased flexion knees b/l, b/l lower leg edema   Neurological:alert, oriented, normal speech, no new focal findings or movement disorder noted from baseline      BP Readings from Last 3 Encounters:   02/27/24 124/80   09/24/20 134/81   12/26/19 133/67     Wt Readings from Last 3 Encounters:   02/27/24 116.8 kg (257 lb 8 oz)   12/26/19 115.7 kg (255 lb)   12/28/18 112.5 kg (248 lb)     /80 (BP Location: Left forearm, Patient Position: Sitting, BP Method: Medium (Manual))   Pulse 62   Temp 98.4 °F (36.9 °C) (Oral)   Ht 5' 5" (1.651 m)   Wt 116.8 kg (257 lb 8 oz)   SpO2 98%   BMI 42.85 kg/m²       274}  Laboratory: I have reviewed old labs below:       274}    Lab Results   Component Value Date    WBC 10.5 (H) 01/25/2017    HGB 12.1 01/25/2017    HCT 36.6 01/25/2017    MCV 81.3 01/25/2017     01/25/2017     01/25/2017    K 3.1 (L) 01/25/2017    CL 99 01/25/2017    CALCIUM 8.5 02/14/2017    CO2 30.4 01/25/2017     (H) 01/25/2017    BUN 23 (H) 01/25/2017    CREATININE 0.74 01/25/2017      Lab reviewed by me: Particular labs of significance that I will monitor, workup, or treat to improve are mentioned below in diagnostic impression remarks.    Imaging/EKG: I have reviewed the pertinent results and my findings are noted in remarks.     274}    CC:   Chief Complaint   Patient presents with    Establish Care           274}    Assessment/Plan  Vania Montano is a 58 y.o. female who presents to clinic with:  1. Encounter to establish care    2. Fall as cause of accidental injury in home as place of occurrence, initial encounter    3. Acute left-sided low back pain without sciatica    4. Screening cholesterol level    5. Postoperative hypothyroidism    6. Essential hypertension    7. Class 3 severe " obesity due to excess calories with serious comorbidity and body mass index (BMI) of 40.0 to 44.9 in adult    8. Encounter for hepatitis C screening test for low risk patient    9. Encounter for screening for HIV    10. Abnormal finding of blood chemistry, unspecified    11. Chronic pain of both knees    12. H/O: gout    13. Mild intermittent asthma without complication    14. Pedal edema    15. Gastroesophageal reflux disease without esophagitis    16. Anxiety and depression    17. Encounter for screening mammogram for malignant neoplasm of breast          274}  Diagnostic Impression Remarks       58 y.o. female who presents to clinic today for     This is the extent of this pleasant patient's concerns at this present time.  I also discussed the importance of close follow up to discuss labs, change or modify her medications if needed, monitor side effects, and further evaluation of medical problems.     Additional workup planned: see labs ordered below.    See below for labs and meds ordered with associated diagnosis      1. Encounter to establish care  - CBC Auto Differential; Future  - Comprehensive Metabolic Panel; Future  - Hemoglobin A1C; Future  - Lipid Panel; Future  - TSH; Future  - T4, Free; Future  - HIV 1/2 Ag/Ab (4th Gen); Future  - Hepatitis C Antibody; Future    2. Fall as cause of accidental injury in home as place of occurrence, initial encounter  - X-Ray Lumbar Spine AP And Lateral; Future  See acute back pain     3. Acute left-sided low back pain without sciatica  - X-Ray Lumbar Spine AP And Lateral; Future  At this point in time the patient is considered a low risk as determined by her history, physical, and the absence of red flags. I have a low suspicion of cord compression since she does not have saddle anesthesia, bowel or bladder incontinence, abnormal reflexes, weakness, or numbness in her arms or legs. Infection is low on my differential since she denies fever, chills, night sweats, IV  drug use, dysuria, flank pain, and recent surgery. I did not appreciate bony tenderness, CVA tenderness, or an infectious source. My suspicion for cancer is low since she did not endorse fever, night sweats, or unintentional weight loss. Aortic dissection is low on the differential since she denies a ripping or tearing sensation chest pain, chest pain that radiates to the back, and sudden severe abdominal pain. On my exam there was no pulse deficit or pulsatile abdominal mass. Based on the history and examination, I feel that the likelihood of a high risk condition requiring emergent imaging is so low that no further testing in this regard is warranted at this point in time.     We discussed at length the warning symptoms in order for the patient to return to clinic and/or present to the ED if unable to reach the clinic within a timely manner including but not limited to: increased pain, change in gait, change in sensation around the rectum or perineum, bowel or bladder issues/incontinent, urinary retention, and fever. Via teach back mechanism, the patient voiced understanding of the aforementioned recommendations/instructions.    Home Care:  You may need to stay in bed the first few days. But, as soon as possible, begin sitting or walking to avoid problems with prolonged bed rest (muscle weakness, worsening back stiffness and pain, blood clots in the legs).  When in bed, try to find a position of comfort. A firm mattress is best. Try lying flat on your back with pillows under your knees. You can also try lying on your side with your knees bent up towards your chest and a pillow between your knees.  Avoid prolonged sitting. This puts more stress on the lower back than standing or walking.  During the first two days after injury, apply an ICE PACK to the painful area for 20 minutes every 2-4 hours. This will reduce swelling and pain. HEAT (hot shower, hot bath or heating pad) works well for muscle spasm. Do not sleep  with heating pad. You can start with ice, then switch to heat after two days. Some patients feel best alternating ice and heat treatments. Use the one method that feels the best to you.  You may use acetaminophen (Tylenol) or ibuprofen (Motrin, Advil) to control pain, unless another pain medicine was prescribed. [NOTE: If you have chronic liver or kidney disease or ever had a stomach ulcer or GI bleeding, talk with your doctor before using these medicines.]  Be aware of safe lifting methods and do not lift anything over 15 pounds until all the pain is gone    4. Screening cholesterol level  - Lipid Panel; Future  - rosuvastatin (CRESTOR) 20 MG tablet; Take 1 tablet (20 mg total) by mouth once daily.  Dispense: 90 tablet; Refill: 3    5. Postoperative hypothyroidism  - TSH; Future  - T4, Free; Future  - levothyroxine (SYNTHROID) 112 MCG tablet; Take 2 tablets (224 mcg total) by mouth once daily.  Dispense: 180 tablet; Refill: 3  - liothyronine (CYTOMEL) 5 MCG Tab; Take 1 tablet (5 mcg total) by mouth once daily.  Dispense: 90 tablet; Refill: 3  Stable. Monitor     6. Essential hypertension  - Comprehensive Metabolic Panel; Future  - metoprolol tartrate (LOPRESSOR) 50 MG tablet; Take 1 tablet (50 mg total) by mouth 2 (two) times daily.  Dispense: 180 tablet; Refill: 3  Stable. Within goal 120/80  Continue current regimen   Monitor    7. Class 3 severe obesity due to excess calories with serious comorbidity and body mass index (BMI) of 40.0 to 44.9 in adult  - CBC Auto Differential; Future  - Comprehensive Metabolic Panel; Future  - Hemoglobin A1C; Future  - Lipid Panel; Future  - TSH; Future    I recommend the following therapeutic lifestyle changes:     Transition to a low salt, primarily plant-based diet which emphasizes:  Increase fiber with vegetables, whole grains, legumes   Increase lean protein by eating beans, legumes, fish, poultry, eggs, bison  Good dairy choices for lean protein include greek yogurt (low  sugar options) and cottage cheese  Replacing butter with healthy fats, such as olive oil and nuts  Using herbs and spices instead of salt to flavor foods   Limiting red meat to no more than a few times a month   Limit added sugars (sodas, fruit juices, fancy coffee drinks)  Limit processed foods        Initiation of a graded aerobic exercise plan (goal 30-45 minutes per day 4-5 times per week)  Patient encouraged to participate in low intensity strength exercising and if unfamiliar with strength and conditioning training, I recommend joining a gym with access to a  to further instruct the patient.      If weight/obesity is a concern a reasonable weight loss goal of 1-2lbs per month to reach a goal of 5-10% weight loss in 5-6 months, or a BMI less than 25.      8. Encounter for hepatitis C screening test for low risk patient  - Hepatitis C Antibody; Future    9. Encounter for screening for HIV  - HIV 1/2 Ag/Ab (4th Gen); Future    10. Abnormal finding of blood chemistry, unspecified  - Hemoglobin A1C; Future    11. Chronic pain of both knees  - Ambulatory referral/consult to Physical/Occupational Therapy; Future  Tylenol otc up to 1 g tid prn     12. H/O: gout  - allopurinoL (ZYLOPRIM) 100 MG tablet; Take 1 tablet (100 mg total) by mouth once daily.  Dispense: 90 tablet; Refill: 3  - URIC ACID; Future  Stable.     13. Mild intermittent asthma without complication  - albuterol (VENTOLIN HFA) 90 mcg/actuation inhaler; Inhale 2 puffs into the lungs every 6 (six) hours as needed for Wheezing. Rescue  Dispense: 18 g; Refill: 0    14. Pedal edema  - furosemide (LASIX) 40 MG tablet; Take 1 tablet (40 mg total) by mouth 2 (two) times daily.  Dispense: 180 tablet; Refill: 3  Stable.     15. Gastroesophageal reflux disease without esophagitis  - omeprazole (PRILOSEC) 20 MG capsule; Take 1 capsule (20 mg total) by mouth once daily.  Dispense: 90 capsule; Refill: 3  Dose reduction. Monitor     16. Anxiety and  depression  - sertraline (ZOLOFT) 100 MG tablet; Take 1 tablet (100 mg total) by mouth once daily.  Dispense: 90 tablet; Refill: 3  Stable. Continue regimen     17. Encounter for screening mammogram for malignant neoplasm of breast  - Mammo Digital Screening Bilat w/ Chacorta; Future      RTC annually or PRN     Evelyn Sosa MD, [facility]   Family Medicine Physician  02/27/2024      If you are due for any health screening(s) below please notify me so we can arrange them to be ordered and scheduled to maintain your health.     Health Maintenance Due   Topic    Cervical Cancer Screening     Lipid Panel     Mammogram        Breast Cancer Screening    Breast cancer is the second most common cancer in women after skin cancer, and the second leading cause of death from cancer after lung cancer. Mammograms can detect breast cancer early, which significantly increases the chances of curing the cancer.      A screening mammogram is an x-ray image of the breasts used for early breast cancer detection. It can help reduce the number of deaths from breast cancer among women. To get a clear image, the breast is placed between two plastic plates to make it flat. How often a mammogram is needed depends on your age and your breast cancer risk.                    February 27, 2024       Vania Montano  [address] [facility] [address]         Dear Vania:    Your [facility] is dedicated to helping you stay healthy with regularly scheduled recommended screenings.  Scheduling routine screenings is important to maintaining good health. Our records indicate that you may be overdue for your screening pap smear. A pap smear screening can help identify patients at risk for developing cervical cancer at an early stage, when it is most likely to be successfully treated.    We encourage you to schedule your appointment with your womens health provider or some primary care providers also perform this screening.    If you  have completed or scheduled your pap smear screening outside of [facility], please notify your primary care team so we can update your health record.      If you have questions or would like to schedule your screening, please contact your primary care clinic.    Sincerely,    Evelyn Sosa MD and your [facility]       The following information is provided to all patients.  This information is to help you find resources for any of the problems found today that may be affecting your health:                Living healthy guide: www.[facility]       Understanding Diabetes: www.diabetes.org       Eating healthy: www.cdc.gov/healthyweight      CDC home safety checklist: www.cdc.gov/steadi/patient.html      Agency on Aging: www.goea.[place].[facility]       Alcoholics anonymous (AA): www.aa.org      Physical Activity: www.lesley.nih.gov/df0qxkk       Tobacco use: www.quitwithusla.org               Rx3: Low Back Pain FAQs    What is low back pain?    Low back pain can occur in any part of the lower back: the middle, right side, and/or left side, and its sometimes possible for the pain to radiate down into the buttocks region or even further into the legs. Low back pain can result from multiple factors, including sudden or abnormal movement, overuse of the surrounding muscles, tight or weak muscles, poor posture, and improper form while moving or lifting objects. But most often the true cause of low back pain is unknown. Pain can come on suddenly or gradually, can last a short time or long time, can range from mild to severe, and typically gets worse when you sit or stand for long periods of time. Sometimes low back pain originates from a source not related to your back at all.    Is there a test for low back pain?    Your healthcare provider will ask you about your symptoms and will perform a physical exam. Depending on your age and your specific symptoms, your doctor may or may not order  imaging tests (X-ray, MRI, CT scan, or bone scan) to help with the diagnosis.    How is low back pain treated?    The mainstay of low back pain treatment is through physical rehabilitation: performing exercises and stretches to increase the strength, endurance, and flexibility of the muscles of back, hip, and legs.    The Rx3 low back pain program organizes the exercises by phases based on how far along you are in the recovery process. Unless your healthcare provider tells you otherwise, start with Phase 1. Advance to the next phase no sooner than 3 weeks and only when you feel you have mastered the exercises and can perform them with minimal effort and discomfort. If you have pain during or after the Phase 1 exercises, or if you have questions about when to go on to the next phase, check with your healthcare provider.    Each phase includes leg and core exercises, a cardio component, and stretches. Make sure you always do all the parts of each phase.    How long is this low back rehabilitation program?    Complete the program at least 3 days a week, increasing to 5 days a week as it becomes easier. Each exercise session should take about 20 minutes, plus the cardio component, and less than 15 minutes for stretching. You will need very little equipment for this program. Each phase takes at least 3 weeks, so the program will take you at least 9 weeks total, but the actual time you need to recover will depend on your specific injury condition.

## 2024-02-29 DIAGNOSIS — E87.6 HYPOKALEMIA: Primary | ICD-10-CM

## 2024-02-29 RX ORDER — POTASSIUM CHLORIDE 20 MEQ/1
20 TABLET, EXTENDED RELEASE ORAL DAILY
Qty: 90 TABLET | Refills: 0 | Status: SHIPPED | OUTPATIENT
Start: 2024-02-29 | End: 2024-05-29

## 2024-03-06 ENCOUNTER — CLINICAL SUPPORT (OUTPATIENT)
Dept: REHABILITATION | Facility: HOSPITAL | Age: 59
End: 2024-03-06
Attending: STUDENT IN AN ORGANIZED HEALTH CARE EDUCATION/TRAINING PROGRAM
Payer: MEDICARE

## 2024-03-06 DIAGNOSIS — G89.29 CHRONIC PAIN OF BOTH KNEES: ICD-10-CM

## 2024-03-06 DIAGNOSIS — M25.60 RANGE OF MOTION DEFICIT: Primary | ICD-10-CM

## 2024-03-06 DIAGNOSIS — M25.561 CHRONIC PAIN OF BOTH KNEES: ICD-10-CM

## 2024-03-06 DIAGNOSIS — M25.562 CHRONIC PAIN OF BOTH KNEES: ICD-10-CM

## 2024-03-06 DIAGNOSIS — R26.9 GAIT ABNORMALITY: ICD-10-CM

## 2024-03-06 PROCEDURE — 97530 THERAPEUTIC ACTIVITIES: CPT | Mod: PN

## 2024-03-06 PROCEDURE — 97161 PT EVAL LOW COMPLEX 20 MIN: CPT | Mod: PN

## 2024-03-07 PROBLEM — R26.9 GAIT ABNORMALITY: Status: ACTIVE | Noted: 2024-03-07

## 2024-03-07 PROBLEM — M25.60 RANGE OF MOTION DEFICIT: Status: ACTIVE | Noted: 2024-03-07

## 2024-03-07 NOTE — PLAN OF CARE
[facility]   Physical Therapy Initial Evaluation      Name: Vania Montano  Clinic Number: [id number]    Therapy Diagnosis:   Encounter Diagnoses   Name Primary?    Chronic pain of both knees     Range of motion deficit Yes    Gait abnormality         Physician: Evelyn Sosa MD    Physician Orders: PT  Eval and Treat  Medical Diagnosis from Referral: [id number],M25.562,G89.29 (ICD-10-CM) - Chronic pain of both knees  Evaluation Date: 3/6/2024  Authorization Period Expiration: 12/31/2024   Plan of Care Expiration: 4/17/2024  Progress Note Due: 4/6/2024  Visit # / Visits authorized: 1/ 1   FOTO: 1/ 3    Precautions: Standard , HTN    Time In: 1000am  Time Out: 1045am  Total Billable Time: 45 minutes    Subjective     Date of onset: patient had trouble remembering when this happened, but thinks it might have been in August    History of current condition - Vania reports: falling at a hotel. She was in the shower and slipped and fell back and hit the counter. She was on the floor and the door was locked. Eventually she had to crawl and open the door and let her mom in. She said there was a hospital across the street but she didn't go to it.   She said it has been hurting since then. She still has some healing bruises. She did not have back pain before that that she can remember.   Its sore in the morning. It hurts when she touches it. It also hurts when she stands and does the dishes. Hurts when she walks to the mailbox and back.  She did have a headache after she fell but she does not think she hit her head and she did not have any bumps or bruises on her head.     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      Medical History:   Past Medical History:   Diagnosis Date    GERD (gastroesophageal reflux disease)     Hypertension     Thyroid disease        Surgical History:   Vania Montano  has a past surgical history that includes Cholecystectomy; Knee arthroscopy (Right); and Colonoscopy (N/A, 9/24/2020).    Medications:   Vania has a current medication list which includes the following prescription(s): albuterol, allopurinol, aspirin, ergocalciferol, furosemide, levothyroxine, liothyronine, metoprolol tartrate, omeprazole, potassium chloride sa, rosuvastatin, and sertraline.    Allergies:   Review of patient's allergies indicates:  No Known Allergies     Objective      Observation:   Patient is a 58 year old female. She points out some healing bruises on her left low back.    Posture:    Increased lumbar lordosis, wide base of support.    Functional Movements:   Gait: walks with a cane. Coming into the clinic she walked with no knee flexion, leaving the clinic she walked more normally.  Single leg balance: Less than 3 seconds on both sides.    Lumbar Range of Motion:   Motion Range of Motion Pain and Quality of Motion   Flexion 50% limited    Extension 50% limited    Right Side Bending 50% limited    Left Side Bending 50% limited Reproduces pain   Right Rotation 50% limited    Left Rotation 50% limited      Special Tests:   Observation/Result   Repeated Flexion Positive      Joint Mobility:   Hypomobile thoracic spine    Palpation:   Tender to palpation in lumbar spine    Intake Outcome Measure for FOTO Lumbar Survey    Therapist reviewed FOTO scores for Vania Montano on 3/6/2024.   FOTO report - see Media section or FOTO account episode details.    Intake Score: 67%  Predicted Score: 78%          Treatment     Total Treatment time (time-based codes) separate from Evaluation: 10 minutes     Vania received the treatments listed below:      therapeutic activities to improve functional performance for 10 minutes, including:    Home Exercise Program education  Side Lying open books  Bridges       Patient Education and Home Exercises     Education provided:   - Home Exercise Program, diagnosis, prognosis, Plan of care     Written Home Exercises Provided: yes. Exercises were reviewed and Vania was able to demonstrate them prior to the end of the session.  Vania demonstrated good  understanding of the education provided. See EMR under Patient Instructions for exercises provided during therapy sessions.    Assessment     Vania is a 58 y.o. female referred to outpatient Physical Therapy with a medical diagnosis of chronic pain of both knees and low back pain. Patient presents with painful and limited range of motion, positive repeated flexion, hypomobility in spine, and significant gait deviations. She has functional limitations of pain with prolonged standing or walking.     Patient prognosis is Fair.   Patient will benefit from skilled outpatient Physical Therapy to address the deficits stated above and in the chart below, provide patient /family education, and to maximize patientt's level of independence.     Plan of care discussed with patient: Yes  Patient's spiritual, cultural and educational needs considered and patient is agreeable to the plan of care and goals as stated below:     Anticipated Barriers for therapy: BMI, co-morbidities    Medical Necessity is demonstrated by the following  History  Co-morbidities and personal factors that may impact the plan of care [] LOW: no personal factors / co-morbidities  [] MODERATE: 1-2 personal factors / co-morbidities  [x] HIGH: 3+ personal factors / co-morbidities    Moderate / High Support Documentation:   Co-morbidities affecting plan of care: GERD,  HTN, thyroid disease, BMI    Personal Factors:   no deficits     Examination  Body Structures and Functions, activity limitations and participation restrictions that may impact the plan of care [] LOW: addressing 1-2 elements  [] MODERATE: 3+ elements  [x] HIGH: 4+ elements (please support below)    Moderate / High Support Documentation: range of motion, posture, gait, strength     Clinical Presentation [x] LOW: stable  [] MODERATE: Evolving  [] HIGH: Unstable     Decision Making/ Complexity Score: low       Goals:  Short Term Goals: 3 weeks   Patient will be independent in Home Exercise Program to support improving function.  Patient will have decreased pain with left side bend to 4/10.  Patient will have improved gait as demonstrated by more consistent and normal knee flexion bilaterally.     Long Term Goals: 6 weeks   Patient will be independent in progressed Home Exercise Program to support improving function.  Patient goal:  less pain every day.   Patient will have improved FOTO score to predicted level to show true functional improvement.   Patient will have pain free range of motion of lumbar spine to increase tolerance of Activities of daily living.    Plan     Plan of care Certification: 3/6/2024 to 4/17/2024.    Outpatient Physical Therapy 1-2 times weekly for 6 weeks to include the following interventions: Gait Training, Manual Therapy, Moist Heat/ Ice, Neuromuscular Re-ed, Patient Education, Therapeutic Activities, and Therapeutic Exercise.     Verna Rome, PT, DPT        Physician's Signature: _________________________________________ Date: ________________

## 2024-03-11 ENCOUNTER — LAB VISIT (OUTPATIENT)
Dept: LAB | Facility: HOSPITAL | Age: 59
End: 2024-03-11
Attending: STUDENT IN AN ORGANIZED HEALTH CARE EDUCATION/TRAINING PROGRAM
Payer: MEDICARE

## 2024-03-11 DIAGNOSIS — E87.6 HYPOKALEMIA: ICD-10-CM

## 2024-03-11 LAB
MAGNESIUM SERPL-MCNC: 2.3 MG/DL (ref 1.6–2.6)
POTASSIUM SERPL-SCNC: 4.4 MMOL/L (ref 3.5–5.1)

## 2024-03-11 PROCEDURE — 83735 ASSAY OF MAGNESIUM: CPT | Performed by: STUDENT IN AN ORGANIZED HEALTH CARE EDUCATION/TRAINING PROGRAM

## 2024-03-11 PROCEDURE — 36415 COLL VENOUS BLD VENIPUNCTURE: CPT | Mod: PO | Performed by: STUDENT IN AN ORGANIZED HEALTH CARE EDUCATION/TRAINING PROGRAM

## 2024-03-11 PROCEDURE — 84132 ASSAY OF SERUM POTASSIUM: CPT | Performed by: STUDENT IN AN ORGANIZED HEALTH CARE EDUCATION/TRAINING PROGRAM

## 2024-03-12 DIAGNOSIS — E87.6 HYPOKALEMIA: Primary | ICD-10-CM

## 2024-03-13 ENCOUNTER — TELEPHONE (OUTPATIENT)
Dept: FAMILY MEDICINE | Facility: CLINIC | Age: 59
End: 2024-03-13
Payer: MEDICARE

## 2024-03-13 ENCOUNTER — CLINICAL SUPPORT (OUTPATIENT)
Dept: REHABILITATION | Facility: HOSPITAL | Age: 59
End: 2024-03-13
Payer: MEDICARE

## 2024-03-13 DIAGNOSIS — M25.60 RANGE OF MOTION DEFICIT: Primary | ICD-10-CM

## 2024-03-13 DIAGNOSIS — R26.9 GAIT ABNORMALITY: ICD-10-CM

## 2024-03-13 PROCEDURE — 97112 NEUROMUSCULAR REEDUCATION: CPT | Mod: PN,CQ

## 2024-03-13 PROCEDURE — 97530 THERAPEUTIC ACTIVITIES: CPT | Mod: PN,CQ

## 2024-03-13 PROCEDURE — 97110 THERAPEUTIC EXERCISES: CPT | Mod: PN,CQ

## 2024-03-13 NOTE — PROGRESS NOTES
"[facility]   Physical Therapy Treatment Note      Name: Vania Montano  Clinic Number: [id number]    Therapy Diagnosis:   Encounter Diagnoses   Name Primary?    Range of motion deficit Yes    Gait abnormality      Physician: Evelyn Sosa MD    Visit Date: 3/13/2024    Physician Orders: PT  Eval and Treat  Medical Diagnosis from Referral: [id number],M25.562,G89.29 (ICD-10-CM) - Chronic pain of both knees  Evaluation Date: 3/6/2024  Authorization Period Expiration: 12/31/2024   Plan of Care Expiration: 4/17/2024  Progress Note Due: 4/6/2024  Visit # / Visits authorized: 2/ 10  PTA Visit #: 1/5     FOTO: 1/ 3     Precautions: Standard , HTN     Time In: 0955  Time Out: 1042  Total Billable Time:  47 minutes       Subjective     Patient reports: she is having "a little bit of pain this morning"  She was compliant with home exercise program.  Response to previous treatment: no complaints   Functional change: first visit after eval    Pain: 1/10  Location: left back      Objective      Objective Measures updated at progress report unless specified.     Treatment     Vania received the treatments listed below:      therapeutic exercises to develop strength, endurance, ROM, flexibility, posture, and core stabilization for 10 minutes including:    Nustep x 5 minutes, level 3 for endurance, cardio, and strengthening (pt with shortness of breath and needed short rest breaks)    Seated exercises:  Long arc quad 2 x 10 reps bilateral lower extremity   Heel raises/toe raises x 20 reps   Marching x 20 reps       neuromuscular re-education activities to improve: Balance, Coordination, Proprioception, and Posture for 27 minutes. The following activities were included:    Supine exercises:  Bridging 2 x 10 reps   Lower trunk rotation 2 x 10 reps   Hip adduction with Ball squeezes x 30 reps   Hip Abduction red theraband x 30 reps   Transverse abdominal sets with red physioball x 30 reps "     Seated exercises:  Open books x 10 reps each  Trunk flexion with large physioball x 20 reps     therapeutic activities to improve functional performance for 10 minutes, including:    Lumbar extension over mat x 20 reps   Hip Abduction 3 x 10 reps   Hip extension 3 x 10 reps       Patient Education and Home Exercises       Education provided:   -apply ice as needed for delayed muscle soreness  -Continue with Home exercise program daily  -++additional exercises given to patient today to do in sitting.  Also given red theraband for hip Abduction.        Written Home Exercises Provided: yes. Exercises were reviewed and Vania was able to demonstrate them prior to the end of the session.  Vania demonstrated good  understanding of the education provided. See Electronic Medical Record under Patient Instructions for exercises provided during therapy sessions    Assessment     Vania arrived to PT with pain in her back but she stated it was minimal.  Pt initially had difficulty with the pain rating scale (rated her pain level as a 9-10) but PTA explained the scale to her.  She rated her pain level as a 1 after explanation.  She was noted to get dyspnea on exertion with exercises and needed to take rest breaks to catch her breath.  She is limited in her knee flexion bilaterally.     Vania Is progressing well towards her goals.   Patient prognosis is Good.     Patient will continue to benefit from skilled outpatient physical therapy to address the deficits listed in the problem list box on initial evaluation, provide pt/family education and to maximize pt's level of independence in the home and community environment.     Patient's spiritual, cultural and educational needs considered and pt agreeable to plan of care and goals.     Anticipated barriers to physical therapy: BMI, co-morbidities     Goals:   Short Term Goals: 3 weeks   Patient will be independent in Home Exercise Program to support improving  function.  Patient will have decreased pain with left side bend to 4/10.  Patient will have improved gait as demonstrated by more consistent and normal knee flexion bilaterally.      Long Term Goals: 6 weeks   Patient will be independent in progressed Home Exercise Program to support improving function.  Patient goal:  less pain every day.   Patient will have improved FOTO score to predicted level to show true functional improvement.   Patient will have pain free range of motion of lumbar spine to increase tolerance of Activities of daily living.     Plan      Plan of care Certification: 3/6/2024 to 4/17/2024.     Outpatient Physical Therapy 1-2 times weekly for 6 weeks to include the following interventions: Gait Training, Manual Therapy, Moist Heat/ Ice, Neuromuscular Re-ed, Patient Education, Therapeutic Activities, and Therapeutic Exercise.       Vandana Byrd, PTA

## 2024-03-13 NOTE — TELEPHONE ENCOUNTER
"Called patient in regards to lab results. Per Dr Sosa    " Lab results show normalized potassium. Continue supplementation and repeat labs in 2 months. Orders placed. Please call to schedule.     Evelyn Sosa MD "    No answer, left voicemail to return call.   "

## 2024-03-13 NOTE — TELEPHONE ENCOUNTER
----- Message from Rach Childress sent at 3/13/2024 11:57 AM CDT -----  Type:  Patient Returning Call    Who Called:  pt  Who Left Message for Patient:  Stefania  Does the patient know what this is regarding?:  test results  Best Call Back Number:  [phone]    Additional Information:  Pt is returning a call in regards to a missed call from the office to go over test results. Please call back and advise. Thanks!

## 2024-03-14 ENCOUNTER — TELEPHONE (OUTPATIENT)
Dept: FAMILY MEDICINE | Facility: CLINIC | Age: 59
End: 2024-03-14
Payer: MEDICARE

## 2024-03-14 NOTE — TELEPHONE ENCOUNTER
Mother would like to know when patient is due for a follow up. Lab work booked for May. Please advise.

## 2024-03-15 ENCOUNTER — CLINICAL SUPPORT (OUTPATIENT)
Dept: REHABILITATION | Facility: HOSPITAL | Age: 59
End: 2024-03-15
Payer: MEDICARE

## 2024-03-15 DIAGNOSIS — M25.60 RANGE OF MOTION DEFICIT: Primary | ICD-10-CM

## 2024-03-15 DIAGNOSIS — R26.9 GAIT ABNORMALITY: ICD-10-CM

## 2024-03-15 PROCEDURE — 97530 THERAPEUTIC ACTIVITIES: CPT | Mod: PN,CQ

## 2024-03-15 PROCEDURE — 97112 NEUROMUSCULAR REEDUCATION: CPT | Mod: PN,CQ

## 2024-03-15 PROCEDURE — 97110 THERAPEUTIC EXERCISES: CPT | Mod: PN,CQ

## 2024-03-15 NOTE — PROGRESS NOTES
[facility]   Physical Therapy Treatment Note      Name: Vania Montano  Clinic Number: [id number]    Therapy Diagnosis:   Encounter Diagnoses   Name Primary?    Range of motion deficit Yes    Gait abnormality        Physician: Evelyn Sosa MD    Visit Date: 3/15/2024    Physician Orders: PT  Eval and Treat  Medical Diagnosis from Referral: [id number],M25.562,G89.29 (ICD-10-CM) - Chronic pain of both knees  Evaluation Date: 3/6/2024  Authorization Period Expiration: 12/31/2024   Plan of Care Expiration: 4/17/2024  Progress Note Due: 4/6/2024  Visit # / Visits authorized: 2/ 10  PTA Visit #: 1/5     FOTO: 1/ 3     Precautions: Standard , HTN     Time In: 1100  Time Out: 1143  Total Billable Time:  43 minutes       Subjective     Patient reports:she is feeling good and not having any pain today.   She stated she has been doing her exercises at home and they have been helping her.    She was compliant with home exercise program.  Response to previous treatment: no complaints   Functional change: first visit after eval    Pain: 0/10  Location: left back      Objective      Objective Measures updated at progress report unless specified.     Treatment     Vania received the treatments listed below:      therapeutic exercises to develop strength, endurance, ROM, flexibility, posture, and core stabilization for 10 minutes including:    Nustep x 5 minutes, level 3 for endurance, cardio, and strengthening (pt with shortness of breath and needed short rest breaks)    Seated exercises:  Long arc quad 2 x 10 reps bilateral lower extremity   Heel raises/toe raises x 20 reps   Marching x 20 reps     neuromuscular re-education activities to improve: Balance, Coordination, Proprioception, and Posture for 23 minutes. The following activities were included:    Supine exercises:  Bridging 2 x 10 reps   Lower trunk rotation 2 x 10 reps   Hip adduction with Ball squeezes x 30 reps   Hip Abduction  "red theraband x 30 reps   Transverse abdominal sets with red physioball x 30 reps     Seated exercises:  Open books x 10 reps each  Trunk flexion with large physioball x 20 reps     therapeutic activities to improve functional performance for 10 minutes, including:    Standing Lumbar extension over mat x 20 reps   Standing Hip Abduction 3 x 10 reps bilateral   Standing Hip extension 3 x 10 reps bilateral   ++Standing ham curls 3 x 10 reps (limited range of motion)    Patient Education and Home Exercises       Education provided:   -apply ice as needed for delayed muscle soreness  -Continue with Home exercise program daily    Written Home Exercises Provided: yes. Exercises were reviewed and Vania was able to demonstrate them prior to the end of the session.  Vania demonstrated good  understanding of the education provided. See Electronic Medical Record under Patient Instructions for exercises provided during therapy sessions    Assessment     Vania arrived to PT with no pain today.  She has been compliant with her exercises at home.  She is noted to ambulate with bilateral lower extremity "stiff" (minimal knee flexion during gait).  Verbal cuing to help increase her knee flexion during gait.  Verbal cues also needed for patient to slow her pace when she does her exercises.      Vania Is progressing well towards her goals.   Patient prognosis is Good.     Patient will continue to benefit from skilled outpatient physical therapy to address the deficits listed in the problem list box on initial evaluation, provide pt/family education and to maximize pt's level of independence in the home and community environment.     Patient's spiritual, cultural and educational needs considered and pt agreeable to plan of care and goals.     Anticipated barriers to physical therapy: BMI, co-morbidities     Goals:   Short Term Goals: 3 weeks   Patient will be independent in Home Exercise Program to support improving " function.  Patient will have decreased pain with left side bend to 4/10.  Patient will have improved gait as demonstrated by more consistent and normal knee flexion bilaterally.      Long Term Goals: 6 weeks   Patient will be independent in progressed Home Exercise Program to support improving function.  Patient goal:  less pain every day.   Patient will have improved FOTO score to predicted level to show true functional improvement.   Patient will have pain free range of motion of lumbar spine to increase tolerance of Activities of daily living.     Plan      Plan of care Certification: 3/6/2024 to 4/17/2024.     Outpatient Physical Therapy 1-2 times weekly for 6 weeks to include the following interventions: Gait Training, Manual Therapy, Moist Heat/ Ice, Neuromuscular Re-ed, Patient Education, Therapeutic Activities, and Therapeutic Exercise.       Vandana Byrd, PTA

## 2024-03-15 NOTE — TELEPHONE ENCOUNTER
Relayed to mother that  Advised annually visit at last appointment and patient encouraged to come in as needed.

## 2024-03-19 ENCOUNTER — OFFICE VISIT (OUTPATIENT)
Dept: FAMILY MEDICINE | Facility: CLINIC | Age: 59
End: 2024-03-19
Payer: MEDICARE

## 2024-03-19 ENCOUNTER — CLINICAL SUPPORT (OUTPATIENT)
Dept: REHABILITATION | Facility: HOSPITAL | Age: 59
End: 2024-03-19
Payer: MEDICARE

## 2024-03-19 VITALS
HEART RATE: 53 BPM | TEMPERATURE: 99 F | OXYGEN SATURATION: 96 % | DIASTOLIC BLOOD PRESSURE: 68 MMHG | HEIGHT: 65 IN | WEIGHT: 262.38 LBS | SYSTOLIC BLOOD PRESSURE: 130 MMHG | BODY MASS INDEX: 43.71 KG/M2
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
## 2024-03-19 DIAGNOSIS — M25.60 RANGE OF MOTION DEFICIT: Primary | ICD-10-CM

## 2024-03-19 DIAGNOSIS — R26.9 GAIT ABNORMALITY: ICD-10-CM

## 2024-03-19 DIAGNOSIS — R10.9 FLANK PAIN: ICD-10-CM
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
## 2024-03-19 PROCEDURE — 99214 OFFICE O/P EST MOD 30 MIN: CPT | Mod: S$PBB,,, | Performed by: STUDENT IN AN ORGANIZED HEALTH CARE EDUCATION/TRAINING PROGRAM

## 2024-03-19 PROCEDURE — 99999 PR PBB SHADOW E&M-EST. PATIENT-LVL IV: CPT | Mod: PBBFAC,,, | Performed by: STUDENT IN AN ORGANIZED HEALTH CARE EDUCATION/TRAINING PROGRAM

## 2024-03-19 PROCEDURE — 97530 THERAPEUTIC ACTIVITIES: CPT | Mod: PN

## 2024-03-19 PROCEDURE — 99214 OFFICE O/P EST MOD 30 MIN: CPT | Mod: PBBFAC,PO | Performed by: STUDENT IN AN ORGANIZED HEALTH CARE EDUCATION/TRAINING PROGRAM

## 2024-03-19 PROCEDURE — 97112 NEUROMUSCULAR REEDUCATION: CPT | Mod: PN

## 2024-03-19 NOTE — PROGRESS NOTES
[facility]   Physical Therapy Treatment Note      Name: Vania Montano  Clinic Number: [id number]    Therapy Diagnosis:   Encounter Diagnoses   Name Primary?    Range of motion deficit Yes    Gait abnormality        Physician: Evelyn Sosa MD    Visit Date: 3/19/2024    Physician Orders: PT  Eval and Treat  Medical Diagnosis from Referral: [id number],M25.562,G89.29 (ICD-10-CM) - Chronic pain of both knees  Evaluation Date: 3/6/2024  Authorization Period Expiration: 12/31/2024   Plan of Care Expiration: 4/17/2024  Progress Note Due: 4/6/2024  Visit # / Visits authorized: 3/ 10  PTA Visit #: 0/5     FOTO: 1/ 3     Precautions: Standard , HTN     Time In: 1000  Time Out: 1045  Total Billable Time:  45 minutes       Subjective     Patient reports: feeling better. Seeing the doctor about her back soon. It does still bother her but overall is much better.    She was compliant with home exercise program.  Response to previous treatment: no complaints   Functional change: first visit after eval    Pain: 0/10  Location: left back      Objective      Objective Measures updated at progress report unless specified.     Treatment     Vania received the treatments listed below:      therapeutic exercises to develop strength, endurance, ROM, flexibility, posture, and core stabilization for 10 minutes including:    Nustep x 5 minutes, level 3 for endurance, cardio, and strengthening (pt with shortness of breath and needed short rest breaks) (not performed today since it was taken)    Seated exercises:  Long arc quad 2 x 10 reps bilateral lower extremity   Heel raises/toe raises x 20 reps   Marching x 20 reps     neuromuscular re-education activities to improve: Balance, Coordination, Proprioception, and Posture for 25 minutes. The following activities were included:    Supine exercises:  Bridging 3 x 10 reps   Lower trunk rotation x 20 reps   Hip adduction with Ball squeezes x 30 reps    Hip Abduction red theraband x 30 reps   Hip Abduction blue theraband x 30 reps   Transverse abdominal sets with red physioball x 30 reps   Side Lying open books x 30 Bilateral     Seated exercises:  Trunk flexion with large physioball x 30 reps     therapeutic activities to improve functional performance for 10 minutes, including:    Standing Lumbar extension over mat x 20 reps   Standing Hip Abduction 4 x 10 reps bilateral   Standing Hip extension 4 x 10 reps bilateral   ++Standing ham curls 3 x 10 reps (limited range of motion)    Patient Education and Home Exercises       Education provided:   -apply ice as needed for delayed muscle soreness  -Continue with Home exercise program daily    Written Home Exercises Provided: yes. Exercises were reviewed and Vania was able to demonstrate them prior to the end of the session.  Vania demonstrated good  understanding of the education provided. See Electronic Medical Record under Patient Instructions for exercises provided during therapy sessions    Assessment     Vania presented with improved symptoms. We progressed reps today and she was fatigued but did well with all interventions.     Vania Is progressing well towards her goals.   Patient prognosis is Good.     Patient will continue to benefit from skilled outpatient physical therapy to address the deficits listed in the problem list box on initial evaluation, provide pt/family education and to maximize pt's level of independence in the home and community environment.     Patient's spiritual, cultural and educational needs considered and pt agreeable to plan of care and goals.     Anticipated barriers to physical therapy: BMI, co-morbidities     Goals:   Short Term Goals: 3 weeks -Progressing, not met   Patient will be independent in Home Exercise Program to support improving function.  Patient will have decreased pain with left side bend to 4/10.  Patient will have improved gait as demonstrated by more  consistent and normal knee flexion bilaterally.      Long Term Goals: 6 weeks -Progressing, not met   Patient will be independent in progressed Home Exercise Program to support improving function.  Patient goal:  less pain every day.   Patient will have improved FOTO score to predicted level to show true functional improvement.   Patient will have pain free range of motion of lumbar spine to increase tolerance of Activities of daily living.     Plan      Plan of care Certification: 3/6/2024 to 4/17/2024.     Outpatient Physical Therapy 1-2 times weekly for 6 weeks to include the following interventions: Gait Training, Manual Therapy, Moist Heat/ Ice, Neuromuscular Re-ed, Patient Education, Therapeutic Activities, and Therapeutic Exercise.       Verna Rome, PT , DPT

## 2024-03-19 NOTE — PROGRESS NOTES
[facility] Note    Subjective:  Chief Complaint:   Chief Complaint   Patient presents with    Follow-up       History of Present Illness:  Vania is here for fall follow up    Patient seen 2/27 s/p fall 4 days earlier   Fell on back in tub  Negative red flags  XR showed Lumbar scoliosis and Multilevel degenerative disc and facet disease.  Conservative management   Concern for persistent swelling/tightness/pain on the back  Denies f/c, n/v, hematuria, abd pain      Allergies:  Review of patient's allergies indicates:  No Known Allergies    Home Medications:  Current Outpatient Medications on File Prior to Visit   Medication Sig    albuterol (VENTOLIN HFA) 90 mcg/actuation inhaler Inhale 2 puffs into the lungs every 6 (six) hours as needed for Wheezing. Rescue    allopurinoL (ZYLOPRIM) 100 MG tablet Take 1 tablet (100 mg total) by mouth once daily.    aspirin 81 MG Chew Take 81 mg by mouth once daily.    ergocalciferol (VITAMIN D2) 50,000 unit Cap Take 50,000 Units by mouth every 7 days.    furosemide (LASIX) 40 MG tablet Take 1 tablet (40 mg total) by mouth 2 (two) times daily.    levothyroxine (SYNTHROID) 112 MCG tablet Take 2 tablets (224 mcg total) by mouth once daily.    liothyronine (CYTOMEL) 5 MCG Tab Take 1 tablet (5 mcg total) by mouth once daily.    metoprolol tartrate (LOPRESSOR) 50 MG tablet Take 1 tablet (50 mg total) by mouth 2 (two) times daily.    omeprazole (PRILOSEC) 20 MG capsule Take 1 capsule (20 mg total) by mouth once daily.    potassium chloride SA (K-DUR,KLOR-CON) 20 MEQ tablet Take 1 tablet (20 mEq total) by mouth once daily.    rosuvastatin (CRESTOR) 20 MG tablet Take 1 tablet (20 mg total) by mouth once daily.    sertraline (ZOLOFT) 100 MG tablet Take 1 tablet (100 mg total) by mouth once daily.     No current facility-administered medications on file prior to visit.       Past Medical History:   Diagnosis Date    GERD (gastroesophageal reflux disease)     Hypertension      "Thyroid disease      Past Surgical History:   Procedure Laterality Date    CHOLECYSTECTOMY      COLONOSCOPY N/A 9/24/2020    Procedure: COLONOSCOPY;  Surgeon: Akash Leach MD;  Location: [facility];  Service: Endoscopy;  Laterality: N/A;    KNEE ARTHROSCOPY Right      Family History   Problem Relation Age of Onset    No Known Problems Mother     No Known Problems Father      Social History     Tobacco Use    Smoking status: Never    Smokeless tobacco: Never            The patient's past medical history, surgical history, social history, family history, allergies and medications have been reviewed.    Review of Systems     10 point review of systems was conducted and only the pertinent positives and pertinent negatives are noted above in the HPI section.    Physical Examination  General appearance: alert, cooperative, no distress, ataxic gait, ambulates with cane   HEENT: normocephalic, atraumatic, PERRLA   Lungs: clear to auscultation, no wheezes, rales or rhonchi, symmetric air entry  Heart: normal rate, regular rhythm, normal S1, S2, no murmurs, rubs, clicks or gallops  Abdomen: soft, nontender, nondistended  Back: no point tenderness over spine, Significant swelling to left-side flank inferior to ribcage, some ecchymosis, tenderness   Skin: No rashes or abnormal skin lesions, no apparent jaundice   Extremities: Full ROM of all extremities, peripheral pulses normal, no unilateral leg swelling or calf tenderness   Neurological:alert, oriented, normal speech, no new focal findings or movement disorder noted from baseline      BP Readings from Last 3 Encounters:   03/19/24 130/68   02/27/24 124/80   09/24/20 134/81     Wt Readings from Last 3 Encounters:   03/19/24 119 kg (262 lb 5.6 oz)   02/27/24 116.8 kg (257 lb 8 oz)   12/26/19 115.7 kg (255 lb)     /68 (BP Location: Left arm, Patient Position: Sitting, BP Method: Medium (Manual))   Pulse (!) 53   Temp 98.5 °F (36.9 °C) (Oral)   Ht 5' 5" (1.651 m)   " Wt 119 kg (262 lb 5.6 oz)   SpO2 96%   BMI 43.66 kg/m²       274}  Laboratory: I have reviewed old labs below:       274}    Lab Results   Component Value Date    WBC 9.72 02/27/2024    HGB 14.1 02/27/2024    HCT 45.0 02/27/2024    MCV 82 02/27/2024     02/27/2024     02/27/2024    K 4.4 03/11/2024     02/27/2024    CALCIUM 9.6 02/27/2024    CO2 25 02/27/2024     (H) 02/27/2024    BUN 11 02/27/2024    CREATININE 0.9 02/27/2024    EGFRNORACEVR >60.0 02/27/2024    ANIONGAP 15 02/27/2024    PROT 7.6 02/27/2024    ALBUMIN 3.7 02/27/2024    BILITOT 0.8 02/27/2024    ALKPHOS 96 02/27/2024    ALT 23 02/27/2024    AST 29 02/27/2024    CHOL 147 02/27/2024    TRIG 117 02/27/2024    HDL 42 02/27/2024    LDLCALC 81.6 02/27/2024    TSH 1.076 02/27/2024    HGBA1C 5.6 02/27/2024      Lab reviewed by me: Particular labs of significance that I will monitor, workup, or treat to improve are mentioned below in diagnostic impression remarks.    Imaging/EKG: I have reviewed the pertinent results and my findings are noted in remarks.     274}    CC:   Chief Complaint   Patient presents with    Follow-up           274}    Assessment/Plan  Vania Montano is a 58 y.o. female who presents to clinic with:  1. Fall as cause of accidental injury in home as place of occurrence, subsequent encounter    2. Flank pain          274}  Diagnostic Impression Remarks       58 y.o. female who presents to clinic today for fall follow up     This is the extent of this pleasant patient's concerns at this present time.  I also discussed the importance of close follow up to discuss labs, change or modify her medications if needed, monitor side effects, and further evaluation of medical problems.     Additional workup planned: see labs ordered below.    See below for labs and meds ordered with associated diagnosis      1. Fall as cause of accidental injury in home as place of occurrence, subsequent encounter  - CT Abdomen Without  Contrast; Future    2. Flank pain  - CT Abdomen Without Contrast; Future    Return precautions provided   RTC PRN    Evelyn Sosa MD, [facility]   Family Medicine Physician  03/19/2024

## 2024-03-21 ENCOUNTER — HOSPITAL ENCOUNTER (OUTPATIENT)
Dept: RADIOLOGY | Facility: HOSPITAL | Age: 59
Discharge: HOME | End: 2024-03-21
Attending: STUDENT IN AN ORGANIZED HEALTH CARE EDUCATION/TRAINING PROGRAM
Payer: MEDICARE
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
## 2024-03-21 DIAGNOSIS — R10.9 FLANK PAIN: ICD-10-CM

## 2024-03-21 PROCEDURE — 25500020 PHARM REV CODE 255: Mod: PO | Performed by: STUDENT IN AN ORGANIZED HEALTH CARE EDUCATION/TRAINING PROGRAM

## 2024-03-21 PROCEDURE — 74160 CT ABDOMEN W/CONTRAST: CPT | Mod: TC,PO

## 2024-03-21 RX ADMIN — IOHEXOL 100 ML: 350 INJECTION, SOLUTION INTRAVENOUS at 10:03

## 2024-03-22 ENCOUNTER — CLINICAL SUPPORT (OUTPATIENT)
Dept: REHABILITATION | Facility: HOSPITAL | Age: 59
End: 2024-03-22
Payer: MEDICARE

## 2024-03-22 DIAGNOSIS — R26.9 GAIT ABNORMALITY: ICD-10-CM

## 2024-03-22 DIAGNOSIS — M25.60 RANGE OF MOTION DEFICIT: Primary | ICD-10-CM

## 2024-03-22 PROCEDURE — 97112 NEUROMUSCULAR REEDUCATION: CPT | Mod: PN

## 2024-03-22 PROCEDURE — 97530 THERAPEUTIC ACTIVITIES: CPT | Mod: PN

## 2024-03-22 NOTE — PROGRESS NOTES
[facility]   Physical Therapy Treatment Note      Name: Vania Montano  Clinic Number: [id number]    Therapy Diagnosis:   Encounter Diagnoses   Name Primary?    Range of motion deficit Yes    Gait abnormality        Physician: Evelyn Sosa MD    Visit Date: 3/22/2024    Physician Orders: PT  Eval and Treat  Medical Diagnosis from Referral: [id number],M25.562,G89.29 (ICD-10-CM) - Chronic pain of both knees  Evaluation Date: 3/6/2024  Authorization Period Expiration: 12/31/2024   Plan of Care Expiration: 4/17/2024  Progress Note Due: 4/6/2024  Visit # / Visits authorized: 4/ 10  PTA Visit #: 0/5     FOTO: 1/ 3     Precautions: Standard , HTN     Time In: 940am  Time Out: 935am  Total Billable Time:  45 minutes    Subjective     Patient reports: saw the doctor and is happy about their prognosis.     She was compliant with home exercise program.  Response to previous treatment: no complaints   Functional change: first visit after eval    Pain: 0/10  Location: left back      Objective      Objective Measures updated at progress report unless specified.     Treatment     Vania received the treatments listed below:      therapeutic exercises to develop strength, endurance, ROM, flexibility, posture, and core stabilization for 10 minutes including:    Nustep x 5 minutes, level 3 for endurance, cardio, and strengthening (pt with shortness of breath and needed short rest breaks) (not performed today since it was taken)    Seated exercises:  Long arc quad 2 x 10 reps bilateral lower extremity   Heel raises/toe raises x 20 reps   Marching x 20 reps     neuromuscular re-education activities to improve: Balance, Coordination, Proprioception, and Posture for 25 minutes. The following activities were included:    Supine exercises:  Bridging 3 x 10 reps   Lower trunk rotation x 20 reps   Hip adduction with Ball squeezes x 30 reps   Hip Abduction red theraband x 30 reps   Hip Abduction blue  theraband x 30 reps   Transverse abdominal sets with red physioball x 30 reps   Side Lying open books x 30 Bilateral     Seated exercises:  Trunk flexion with large physioball x 30 reps     therapeutic activities to improve functional performance for 20 minutes, including:    Standing Lumbar extension over mat x 20 reps   Standing Hip Abduction 4 x 10 reps bilateral   Standing Hip extension 4 x 10 reps bilateral   ++Standing ham curls 3 x 10 reps (limited range of motion)    Patient Education and Home Exercises       Education provided:   -apply ice as needed for delayed muscle soreness  -Continue with Home exercise program daily    Written Home Exercises Provided: yes. Exercises were reviewed and Vania was able to demonstrate them prior to the end of the session.  Vania demonstrated good  understanding of the education provided. See Electronic Medical Record under Patient Instructions for exercises provided during therapy sessions    Assessment     Vania presented with good symptoms today. She tolerated interventions well. She showed good endurance with interventions. Will progress as able.    Vania Is progressing well towards her goals.   Patient prognosis is Good.     Patient will continue to benefit from skilled outpatient physical therapy to address the deficits listed in the problem list box on initial evaluation, provide pt/family education and to maximize pt's level of independence in the home and community environment.     Patient's spiritual, cultural and educational needs considered and pt agreeable to plan of care and goals.     Anticipated barriers to physical therapy: BMI, co-morbidities     Goals:   Short Term Goals: 3 weeks -Progressing, not met   Patient will be independent in Home Exercise Program to support improving function.  Patient will have decreased pain with left side bend to 4/10.  Patient will have improved gait as demonstrated by more consistent and normal knee flexion  bilaterally.      Long Term Goals: 6 weeks -Progressing, not met   Patient will be independent in progressed Home Exercise Program to support improving function.  Patient goal:  less pain every day.   Patient will have improved FOTO score to predicted level to show true functional improvement.   Patient will have pain free range of motion of lumbar spine to increase tolerance of Activities of daily living.     Plan      Plan of care Certification: 3/6/2024 to 4/17/2024.     Outpatient Physical Therapy 1-2 times weekly for 6 weeks to include the following interventions: Gait Training, Manual Therapy, Moist Heat/ Ice, Neuromuscular Re-ed, Patient Education, Therapeutic Activities, and Therapeutic Exercise.       Verna Rome, PT , DPT

## 2024-03-28 ENCOUNTER — CLINICAL SUPPORT (OUTPATIENT)
Dept: REHABILITATION | Facility: HOSPITAL | Age: 59
End: 2024-03-28
Payer: MEDICARE

## 2024-03-28 DIAGNOSIS — M25.60 RANGE OF MOTION DEFICIT: Primary | ICD-10-CM

## 2024-03-28 DIAGNOSIS — R26.9 GAIT ABNORMALITY: ICD-10-CM

## 2024-03-28 PROCEDURE — 97530 THERAPEUTIC ACTIVITIES: CPT | Mod: PN

## 2024-03-28 NOTE — PROGRESS NOTES
[facility]   Physical Therapy Treatment Note      Name: Vania Montano  Clinic Number: [id number]    Therapy Diagnosis:   Encounter Diagnoses   Name Primary?    Range of motion deficit Yes    Gait abnormality        Physician: Evelyn Sosa MD    Visit Date: 3/28/2024    Physician Orders: PT  Eval and Treat  Medical Diagnosis from Referral: [id number],M25.562,G89.29 (ICD-10-CM) - Chronic pain of both knees  Evaluation Date: 3/6/2024  Authorization Period Expiration: 12/31/2024   Plan of Care Expiration: 4/17/2024  Progress Note Due: 4/6/2024  Visit # / Visits authorized: 5/ 10  PTA Visit #: 0/5     FOTO: 1/ 3     Precautions: Standard , HTN     Time In: 1015am  Time Out: 1105am  Total Billable Time:  30 minutes    Subjective     Patient reports: feeling fine, just a little pain in the morning.     She was compliant with home exercise program.  Response to previous treatment: no complaints   Functional change: improved pain level     Pain: 0/10  Location: left back      Objective      Objective Measures updated at progress report unless specified.     Treatment     Vania received the treatments listed below:      therapeutic exercises to develop strength, endurance, ROM, flexibility, posture, and core stabilization for 0 minutes including:      neuromuscular re-education activities to improve: Balance, Coordination, Proprioception, and Posture for 20 minutes. The following activities were included:    Supine exercises:  Bridging 3 x 10 reps   Lower trunk rotation x 20 reps   Hip adduction with Ball squeezes x 30 reps   Hip Abduction blue theraband x 30 reps   Transverse abdominal sets with red physioball x 30 reps   Side Lying open books x 30 Bilateral     Seated exercises:  Trunk flexion with large physioball x 30 reps   Long arc quad 2 x 10 reps bilateral lower extremity   Heel raises/toe raises x 20 reps   Marching x 20 reps     therapeutic activities to improve functional  performance for 30 minutes, including:    Standing Hip Abduction 4 x 10 reps bilateral   Standing Hip extension 4 x 10 reps bilateral   Standing ham curls 3 x 10 reps (limited range of motion)  Nustep x 8 minutes, level 1 for endurance, cardio, and strengthening     Patient Education and Home Exercises       Education provided:   -apply ice as needed for delayed muscle soreness  -Continue with Home exercise program daily    Written Home Exercises Provided: yes. Exercises were reviewed and Vania was able to demonstrate them prior to the end of the session.  Vania demonstrated good  understanding of the education provided. See Electronic Medical Record under Patient Instructions for exercises provided during therapy sessions    Assessment     Vania presented with improved lumbar symptoms. She tolerated interventions well with no pain. Will reassess next week for potential discharge.    Vania Is progressing well towards her goals.   Patient prognosis is Good.     Patient will continue to benefit from skilled outpatient physical therapy to address the deficits listed in the problem list box on initial evaluation, provide pt/family education and to maximize pt's level of independence in the home and community environment.     Patient's spiritual, cultural and educational needs considered and pt agreeable to plan of care and goals.     Anticipated barriers to physical therapy: BMI, co-morbidities     Goals:   Short Term Goals: 3 weeks -Progressing, not met   Patient will be independent in Home Exercise Program to support improving function.  Patient will have decreased pain with left side bend to 4/10.  Patient will have improved gait as demonstrated by more consistent and normal knee flexion bilaterally.      Long Term Goals: 6 weeks -Progressing, not met   Patient will be independent in progressed Home Exercise Program to support improving function.  Patient goal:  less pain every day.   Patient will have  improved FOTO score to predicted level to show true functional improvement.   Patient will have pain free range of motion of lumbar spine to increase tolerance of Activities of daily living.     Plan      Plan of care Certification: 3/6/2024 to 4/17/2024.     Outpatient Physical Therapy 1-2 times weekly for 6 weeks to include the following interventions: Gait Training, Manual Therapy, Moist Heat/ Ice, Neuromuscular Re-ed, Patient Education, Therapeutic Activities, and Therapeutic Exercise.       Verna Rome, PT , DPT

## 2024-04-01 ENCOUNTER — CLINICAL SUPPORT (OUTPATIENT)
Dept: REHABILITATION | Facility: HOSPITAL | Age: 59
End: 2024-04-01
Payer: MEDICARE

## 2024-04-01 DIAGNOSIS — M25.60 RANGE OF MOTION DEFICIT: Primary | ICD-10-CM

## 2024-04-01 DIAGNOSIS — R26.9 GAIT ABNORMALITY: ICD-10-CM

## 2024-04-01 PROCEDURE — 97530 THERAPEUTIC ACTIVITIES: CPT | Mod: PN

## 2024-04-01 PROCEDURE — 97112 NEUROMUSCULAR REEDUCATION: CPT | Mod: PN

## 2024-04-01 NOTE — PROGRESS NOTES
[facility]   Physical Therapy Treatment Note      Name: Vania Montano  Clinic Number: [id number]    Therapy Diagnosis:   Encounter Diagnoses   Name Primary?    Range of motion deficit Yes    Gait abnormality        Physician: Evelyn Sosa MD    Visit Date: 4/1/2024    Physician Orders: PT  Eval and Treat  Medical Diagnosis from Referral: [id number],M25.562,G89.29 (ICD-10-CM) - Chronic pain of both knees  Evaluation Date: 3/6/2024  Authorization Period Expiration: 12/31/2024   Plan of Care Expiration: 4/17/2024  Progress Note Due: 4/6/2024  Visit # / Visits authorized: 6/ 10  PTA Visit #: 0/5     FOTO: 1/ 3     Precautions: Standard , HTN     Time In: 1045am   Time Out: 1140am  Total Billable Time:  55 minutes    Subjective     Patient reports: some pain today. Sore with flexion.    She was compliant with home exercise program.  Response to previous treatment: no complaints   Functional change: improved pain level     Pain: 3/10  Location: left back      Objective      Objective Measures updated at progress report unless specified.     Limited range of motion into flexion.     Treatment     Vania received the treatments listed below:      therapeutic exercises to develop strength, endurance, ROM, flexibility, posture, and core stabilization for 0 minutes including:      neuromuscular re-education activities to improve: Balance, Coordination, Proprioception, and Posture for 25 minutes. The following activities were included:    Supine exercises:  Bridging 3 x 10 reps   Lower trunk rotation x 20 reps   Hip adduction with Ball squeezes x 30 reps   Hip Abduction blue theraband x 30 reps   Transverse abdominal sets with red physioball x 30 reps   Side Lying open books x 30 Bilateral     Seated exercises:  Trunk flexion with large physioball x 30 reps   Long arc quad 2 x 10 reps bilateral lower extremity, 2#  Heel raises/toe raises x 20 reps   Marching x 20 reps     therapeutic  activities to improve functional performance for 30 minutes, including:    Standing Hip Abduction 4 x 10 reps bilateral 2#  Standing Hip extension 4 x 10 reps bilateral 2#  Standing ham curls 3 x 10 reps (limited range of motion) 2#  Nustep x 10 minutes, level 1 for endurance, cardio, and strengthening     Patient Education and Home Exercises       Education provided:   -apply ice as needed for delayed muscle soreness  -Continue with Home exercise program daily    Written Home Exercises Provided: yes. Exercises were reviewed and Vania was able to demonstrate them prior to the end of the session.  Vania demonstrated good  understanding of the education provided. See Electronic Medical Record under Patient Instructions for exercises provided during therapy sessions    Assessment     Vania presented with some lumbar soreness. She did well with progressions including increased weight with exercises and increased time on the Nustep. Will progress as able with aim to discharge in a couple weeks when she is independent in Home Exercise Program.    Vania Is progressing well towards her goals.   Patient prognosis is Good.     Patient will continue to benefit from skilled outpatient physical therapy to address the deficits listed in the problem list box on initial evaluation, provide pt/family education and to maximize pt's level of independence in the home and community environment.     Patient's spiritual, cultural and educational needs considered and pt agreeable to plan of care and goals.     Anticipated barriers to physical therapy: BMI, co-morbidities     Goals:   Short Term Goals: 3 weeks -Progressing, not met   Patient will be independent in Home Exercise Program to support improving function.  Patient will have decreased pain with left side bend to 4/10.  Patient will have improved gait as demonstrated by more consistent and normal knee flexion bilaterally.      Long Term Goals: 6 weeks -Progressing, not met    Patient will be independent in progressed Home Exercise Program to support improving function.  Patient goal:  less pain every day.   Patient will have improved FOTO score to predicted level to show true functional improvement.   Patient will have pain free range of motion of lumbar spine to increase tolerance of Activities of daily living.     Plan      Plan of care Certification: 3/6/2024 to 4/17/2024.     Outpatient Physical Therapy 1-2 times weekly for 6 weeks to include the following interventions: Gait Training, Manual Therapy, Moist Heat/ Ice, Neuromuscular Re-ed, Patient Education, Therapeutic Activities, and Therapeutic Exercise.       Verna Rome, PT , DPT

## 2024-04-04 ENCOUNTER — PATIENT MESSAGE (OUTPATIENT)
Dept: ADMINISTRATIVE | Facility: HOSPITAL | Age: 59
End: 2024-04-04
Payer: MEDICARE

## 2024-04-09 ENCOUNTER — CLINICAL SUPPORT (OUTPATIENT)
Dept: REHABILITATION | Facility: HOSPITAL | Age: 59
End: 2024-04-09
Payer: MEDICARE

## 2024-04-09 DIAGNOSIS — R26.9 GAIT ABNORMALITY: ICD-10-CM

## 2024-04-09 DIAGNOSIS — M25.60 RANGE OF MOTION DEFICIT: Primary | ICD-10-CM

## 2024-04-09 PROCEDURE — 97530 THERAPEUTIC ACTIVITIES: CPT | Mod: PN

## 2024-04-09 PROCEDURE — 97112 NEUROMUSCULAR REEDUCATION: CPT | Mod: PN

## 2024-04-09 NOTE — PROGRESS NOTES
[facility]   Physical Therapy Treatment Note      Name: Vania Montano  Clinic Number: [id number]    Therapy Diagnosis:   Encounter Diagnoses   Name Primary?    Range of motion deficit Yes    Gait abnormality        Physician: Evelyn Sosa MD    Visit Date: 4/9/2024    Physician Orders: PT  Eval and Treat  Medical Diagnosis from Referral: [id number],M25.562,G89.29 (ICD-10-CM) - Chronic pain of both knees  Evaluation Date: 3/6/2024  Authorization Period Expiration: 12/31/2024   Plan of Care Expiration: 4/17/2024  Progress Note Due: 4/6/2024  Visit # / Visits authorized: 7/ 10  PTA Visit #: 0/5     FOTO: 1/ 3     Precautions: Standard , HTN     Time In: 1040am   Time Out: 1135am  Total Billable Time:  55 minutes    Subjective     Patient reports: hurting today. Does some exercises at home but would like more.    She was compliant with home exercise program.  Response to previous treatment: no complaints   Functional change: improved pain level     Pain: 3/10  Location: left back      Objective      Objective Measures updated at progress report unless specified.     Limited range of motion into flexion.     Treatment     Vania received the treatments listed below:      therapeutic exercises to develop strength, endurance, ROM, flexibility, posture, and core stabilization for 0 minutes including:      neuromuscular re-education activities to improve: Balance, Coordination, Proprioception, and Posture for 25 minutes. The following activities were included:    Supine exercises:  Bridging 3 x 10 reps   Lower trunk rotation x 20 reps   Hip adduction with Ball squeezes x 30 reps   Hip Abduction blue theraband x 30 reps   Transverse abdominal sets with red physioball x 30 reps   Side Lying open books x 30 Bilateral     Seated exercises:  Trunk flexion with large physioball x 30 reps   Long arc quad 2 x 10 reps bilateral lower extremity, 2#  Heel raises/toe raises x 20 reps   Marching  x 20 reps     therapeutic activities to improve functional performance for 30 minutes, including:    Standing Hip Abduction 4 x 10 reps bilateral 2#  Standing Hip extension 4 x 10 reps bilateral 2#  Standing ham curls 3 x 10 reps (limited range of motion) 2#  Nustep x 10 minutes, level 2 for endurance, cardio, and strengthening     Patient Education and Home Exercises       Education provided:   -apply ice as needed for delayed muscle soreness  -Continue with Home exercise program daily    Written Home Exercises Provided: yes. Exercises were reviewed and Vania was able to demonstrate them prior to the end of the session.  Vania demonstrated good  understanding of the education provided. See Electronic Medical Record under Patient Instructions for exercises provided during therapy sessions    Assessment     Vania presented with reports of pain and tenderness in her back that improved with activity. More exercises given for home and she was encouraged to perform her Home Exercise Program consistently.     Vania Is progressing well towards her goals.   Patient prognosis is Good.     Patient will continue to benefit from skilled outpatient physical therapy to address the deficits listed in the problem list box on initial evaluation, provide pt/family education and to maximize pt's level of independence in the home and community environment.     Patient's spiritual, cultural and educational needs considered and pt agreeable to plan of care and goals.     Anticipated barriers to physical therapy: BMI, co-morbidities     Goals:   Short Term Goals: 3 weeks -Progressing, not met   Patient will be independent in Home Exercise Program to support improving function.  Patient will have decreased pain with left side bend to 4/10.  Patient will have improved gait as demonstrated by more consistent and normal knee flexion bilaterally.      Long Term Goals: 6 weeks -Progressing, not met   Patient will be independent in  progressed Home Exercise Program to support improving function.  Patient goal:  less pain every day.   Patient will have improved FOTO score to predicted level to show true functional improvement.   Patient will have pain free range of motion of lumbar spine to increase tolerance of Activities of daily living.     Plan      Plan of care Certification: 3/6/2024 to 4/17/2024.     Outpatient Physical Therapy 1-2 times weekly for 6 weeks to include the following interventions: Gait Training, Manual Therapy, Moist Heat/ Ice, Neuromuscular Re-ed, Patient Education, Therapeutic Activities, and Therapeutic Exercise.       Verna Rome, PT , DPT

## 2024-04-11 ENCOUNTER — CLINICAL SUPPORT (OUTPATIENT)
Dept: REHABILITATION | Facility: HOSPITAL | Age: 59
End: 2024-04-11
Payer: MEDICARE

## 2024-04-11 DIAGNOSIS — M25.60 RANGE OF MOTION DEFICIT: Primary | ICD-10-CM

## 2024-04-11 DIAGNOSIS — R26.9 GAIT ABNORMALITY: ICD-10-CM

## 2024-04-11 PROCEDURE — 97530 THERAPEUTIC ACTIVITIES: CPT | Mod: PN

## 2024-04-11 PROCEDURE — 97112 NEUROMUSCULAR REEDUCATION: CPT | Mod: PN

## 2024-04-11 NOTE — PROGRESS NOTES
[facility]   Physical Therapy Treatment Note      Name: Vania Montano  Clinic Number: [id number]    Therapy Diagnosis:   Encounter Diagnoses   Name Primary?    Range of motion deficit Yes    Gait abnormality        Physician: Evelyn Sosa MD    Visit Date: 4/11/2024    Physician Orders: PT  Eval and Treat  Medical Diagnosis from Referral: [id number],M25.562,G89.29 (ICD-10-CM) - Chronic pain of both knees  Evaluation Date: 3/6/2024  Authorization Period Expiration: 12/31/2024   Plan of Care Expiration: 4/17/2024  Progress Note Due: 4/6/2024  Visit # / Visits authorized: 8/ 10  PTA Visit #: 0/5     FOTO: 2/ 3         Precautions: Standard , HTN     Time In: 1050am   Time Out: 1145am   Total Billable Time:  55 minutes    Subjective     Patient reports: feeling better. Did her new exercises at home. Feels like she has made good progress.     She was compliant with home exercise program.  Response to previous treatment: no complaints   Functional change: improved pain level     Pain: 3/10  Location: left back      Objective      Objective Measures updated at progress report unless specified.     Limited range of motion into flexion.     Treatment     Vania received the treatments listed below:      therapeutic exercises to develop strength, endurance, ROM, flexibility, posture, and core stabilization for 0 minutes including:      neuromuscular re-education activities to improve: Balance, Coordination, Proprioception, and Posture for 25 minutes. The following activities were included:    Supine exercises:  Bridging 3 x 10 reps   Lower trunk rotation x 20 reps   Hip adduction with Ball squeezes x 30 reps   Hip Abduction blue theraband x 30 reps   Transverse abdominal sets with red physioball x 30 reps   Side Lying open books x 30 Bilateral     Seated exercises:  Trunk flexion with large physioball x 30 reps   Long arc quad 2 x 10 reps bilateral lower extremity, 2#  Heel  raises/toe raises x 20 reps   Marching x 20 reps     therapeutic activities to improve functional performance for 30 minutes, including:    Standing Hip Abduction 4 x 10 reps bilateral 2#  Standing Hip extension 4 x 10 reps bilateral 2#  Standing ham curls 3 x 10 reps (limited range of motion) 2#  Nustep x 10 minutes, level 2 for endurance, cardio, and strengthening     Patient Education and Home Exercises       Education provided:   -apply ice as needed for delayed muscle soreness  -Continue with Home exercise program daily    Written Home Exercises Provided: yes. Exercises were reviewed and Vania was able to demonstrate them prior to the end of the session.  Vania demonstrated good  understanding of the education provided. See Electronic Medical Record under Patient Instructions for exercises provided during therapy sessions    Assessment     Vania tolerated treatment well with decreases in low back soreness. She has tolerated everything well and is independent in her Home Exercise Program. Next session we will review her Home Exercise Program and discharge as long as everything continues to go well for her.     Vania Is progressing well towards her goals.   Patient prognosis is Good.     Patient will continue to benefit from skilled outpatient physical therapy to address the deficits listed in the problem list box on initial evaluation, provide pt/family education and to maximize pt's level of independence in the home and community environment.     Patient's spiritual, cultural and educational needs considered and pt agreeable to plan of care and goals.     Anticipated barriers to physical therapy: BMI, co-morbidities     Goals:   Short Term Goals: 3 weeks   Patient will be independent in Home Exercise Program to support improving function. -Met  Patient will have decreased pain with left side bend to 4/10. -Met  Patient will have improved gait as demonstrated by more consistent and normal knee flexion  bilaterally. -Progressing, not met      Long Term Goals: 6 weeks -Progressing, not met   Patient will be independent in progressed Home Exercise Program to support improving function.  Patient goal:  less pain every day.   Patient will have improved FOTO score to predicted level to show true functional improvement.   Patient will have pain free range of motion of lumbar spine to increase tolerance of Activities of daily living.     Plan      Plan of care Certification: 3/6/2024 to 4/17/2024.     Outpatient Physical Therapy 1-2 times weekly for 6 weeks to include the following interventions: Gait Training, Manual Therapy, Moist Heat/ Ice, Neuromuscular Re-ed, Patient Education, Therapeutic Activities, and Therapeutic Exercise.       Verna Rome, PT , DPT

## 2024-04-16 ENCOUNTER — CLINICAL SUPPORT (OUTPATIENT)
Dept: REHABILITATION | Facility: HOSPITAL | Age: 59
End: 2024-04-16
Payer: MEDICARE

## 2024-04-16 DIAGNOSIS — M25.60 RANGE OF MOTION DEFICIT: Primary | ICD-10-CM

## 2024-04-16 DIAGNOSIS — R26.9 GAIT ABNORMALITY: ICD-10-CM

## 2024-04-16 PROCEDURE — 97530 THERAPEUTIC ACTIVITIES: CPT | Mod: PN

## 2024-04-16 NOTE — PLAN OF CARE
[facility]   Physical Therapy Treatment Note      Name: Vania Montano  Clinic Number: [id number]    Therapy Diagnosis:   Encounter Diagnoses   Name Primary?    Range of motion deficit Yes    Gait abnormality        Physician: Evelyn Sosa MD    Visit Date: 4/16/2024    Physician Orders: PT  Eval and Treat  Medical Diagnosis from Referral: [id number],M25.562,G89.29 (ICD-10-CM) - Chronic pain of both knees  Evaluation Date: 3/6/2024  Authorization Period Expiration: 12/31/2024   Plan of Care Expiration: 4/17/2024  Progress Note Due: 4/6/2024  Visit # / Visits authorized: 9/ 10  PTA Visit #: 0/5     FOTO: 3/ 3         Precautions: Standard , HTN     Time In: 1100am  Time Out: 1155am  Total Billable Time:  55 minutes    Subjective     Patient reports: feeling good today. Ready to discharge.    She was compliant with home exercise program.  Response to previous treatment: no complaints   Functional change: improved pain level     Pain: 3/10  Location: left back      Objective      Objective Measures updated at progress report unless specified.     Limited range of motion into flexion.     Treatment     Vania received the treatments listed below:      therapeutic exercises to develop strength, endurance, ROM, flexibility, posture, and core stabilization for 0 minutes including:      neuromuscular re-education activities to improve: Balance, Coordination, Proprioception, and Posture for 25 minutes. The following activities were included:    Supine exercises:  Bridging 3 x 10 reps   Lower trunk rotation x 20 reps   Hip adduction with Ball squeezes x 30 reps   Hip Abduction blue theraband x 30 reps   Transverse abdominal sets with red physioball x 30 reps   Side Lying open books x 30 Bilateral     Seated exercises:  Trunk flexion with large physioball x 30 reps   Long arc quad 2 x 10 reps bilateral lower extremity, 2#  Heel raises/toe raises x 20 reps   Marching x 20 reps      therapeutic activities to improve functional performance for 30 minutes, including:    Standing Hip Abduction 4 x 10 reps bilateral 2#  Standing Hip extension 4 x 10 reps bilateral 2#  Standing ham curls 3 x 10 reps (limited range of motion) 2#  Nustep x 10 minutes, level 2 for endurance, cardio, and strengthening     Patient Education and Home Exercises       Education provided:   -apply ice as needed for delayed muscle soreness  -Continue with Home exercise program daily    Written Home Exercises Provided: yes. Exercises were reviewed and Vania was able to demonstrate them prior to the end of the session.  Vania demonstrated good  understanding of the education provided. See Electronic Medical Record under Patient Instructions for exercises provided during therapy sessions    Assessment     Vania presented with improved symptoms. FOTO score reached predicted level. She tolerated exercises well and is independent in Home Exercise Program. She is discharged at this time.    Vania Is progressing well towards her goals.   Patient prognosis is Good.     Patient will continue to benefit from skilled outpatient physical therapy to address the deficits listed in the problem list box on initial evaluation, provide pt/family education and to maximize pt's level of independence in the home and community environment.     Patient's spiritual, cultural and educational needs considered and pt agreeable to plan of care and goals.     Anticipated barriers to physical therapy: BMI, co-morbidities     Goals:   Short Term Goals: 3 weeks   Patient will be independent in Home Exercise Program to support improving function. -Met  Patient will have decreased pain with left side bend to 4/10. -Met  Patient will have improved gait as demonstrated by more consistent and normal knee flexion bilaterally. -Not met     Long Term Goals: 6 weeks -Met  Patient will be independent in progressed Home Exercise Program to support improving  function.  Patient goal:  less pain every day.   Patient will have improved FOTO score to predicted level to show true functional improvement.   Patient will have pain free range of motion of lumbar spine to increase tolerance of Activities of daily living.     Plan      Plan of care Certification: 3/6/2024 to 4/17/2024.     Discharged.       Verna Rome, PT , DPT

## 2024-04-16 NOTE — PROGRESS NOTES
[facility]   Physical Therapy Treatment Note      Name: Vania Montano  Clinic Number: [id number]    Therapy Diagnosis:   Encounter Diagnoses   Name Primary?    Range of motion deficit Yes    Gait abnormality        Physician: Evelyn Sosa MD    Visit Date: 4/16/2024    Physician Orders: PT  Eval and Treat  Medical Diagnosis from Referral: [id number],M25.562,G89.29 (ICD-10-CM) - Chronic pain of both knees  Evaluation Date: 3/6/2024  Authorization Period Expiration: 12/31/2024   Plan of Care Expiration: 4/17/2024  Progress Note Due: 4/6/2024  Visit # / Visits authorized: 9/ 10  PTA Visit #: 0/5     FOTO: 3/ 3         Precautions: Standard , HTN     Time In: 1100am  Time Out: 1155am  Total Billable Time:  55 minutes    Subjective     Patient reports: feeling good today. Ready to discharge.    She was compliant with home exercise program.  Response to previous treatment: no complaints   Functional change: improved pain level     Pain: 3/10  Location: left back      Objective      Objective Measures updated at progress report unless specified.     Limited range of motion into flexion.     Treatment     Vania received the treatments listed below:      therapeutic exercises to develop strength, endurance, ROM, flexibility, posture, and core stabilization for 0 minutes including:      neuromuscular re-education activities to improve: Balance, Coordination, Proprioception, and Posture for 25 minutes. The following activities were included:    Supine exercises:  Bridging 3 x 10 reps   Lower trunk rotation x 20 reps   Hip adduction with Ball squeezes x 30 reps   Hip Abduction blue theraband x 30 reps   Transverse abdominal sets with red physioball x 30 reps   Side Lying open books x 30 Bilateral     Seated exercises:  Trunk flexion with large physioball x 30 reps   Long arc quad 2 x 10 reps bilateral lower extremity, 2#  Heel raises/toe raises x 20 reps   Marching x 20 reps      therapeutic activities to improve functional performance for 30 minutes, including:    Standing Hip Abduction 4 x 10 reps bilateral 2#  Standing Hip extension 4 x 10 reps bilateral 2#  Standing ham curls 3 x 10 reps (limited range of motion) 2#  Nustep x 10 minutes, level 2 for endurance, cardio, and strengthening     Patient Education and Home Exercises       Education provided:   -apply ice as needed for delayed muscle soreness  -Continue with Home exercise program daily    Written Home Exercises Provided: yes. Exercises were reviewed and Vania was able to demonstrate them prior to the end of the session.  Vania demonstrated good  understanding of the education provided. See Electronic Medical Record under Patient Instructions for exercises provided during therapy sessions    Assessment     Vania presented with improved symptoms. FOTO score reached predicted level. She tolerated exercises well and is independent in Home Exercise Program. She is discharged at this time.    Vania Is progressing well towards her goals.   Patient prognosis is Good.     Patient will continue to benefit from skilled outpatient physical therapy to address the deficits listed in the problem list box on initial evaluation, provide pt/family education and to maximize pt's level of independence in the home and community environment.     Patient's spiritual, cultural and educational needs considered and pt agreeable to plan of care and goals.     Anticipated barriers to physical therapy: BMI, co-morbidities     Goals:   Short Term Goals: 3 weeks   Patient will be independent in Home Exercise Program to support improving function. -Met  Patient will have decreased pain with left side bend to 4/10. -Met  Patient will have improved gait as demonstrated by more consistent and normal knee flexion bilaterally. -Not met     Long Term Goals: 6 weeks -Met  Patient will be independent in progressed Home Exercise Program to support improving  function.  Patient goal:  less pain every day.   Patient will have improved FOTO score to predicted level to show true functional improvement.   Patient will have pain free range of motion of lumbar spine to increase tolerance of Activities of daily living.     Plan      Plan of care Certification: 3/6/2024 to 4/17/2024.     Discharged.       Verna Rome, PT , DPT

## 2024-05-10 ENCOUNTER — LAB VISIT (OUTPATIENT)
Dept: LAB | Facility: HOSPITAL | Age: 59
End: 2024-05-10
Attending: STUDENT IN AN ORGANIZED HEALTH CARE EDUCATION/TRAINING PROGRAM
Payer: MEDICARE

## 2024-05-10 DIAGNOSIS — E87.6 HYPOKALEMIA: ICD-10-CM

## 2024-05-10 LAB
MAGNESIUM SERPL-MCNC: 2.1 MG/DL (ref 1.6–2.6)
POTASSIUM SERPL-SCNC: 3.7 MMOL/L (ref 3.5–5.1)

## 2024-05-10 PROCEDURE — 84132 ASSAY OF SERUM POTASSIUM: CPT | Performed by: STUDENT IN AN ORGANIZED HEALTH CARE EDUCATION/TRAINING PROGRAM

## 2024-05-10 PROCEDURE — 36415 COLL VENOUS BLD VENIPUNCTURE: CPT | Mod: PO | Performed by: STUDENT IN AN ORGANIZED HEALTH CARE EDUCATION/TRAINING PROGRAM

## 2024-05-10 PROCEDURE — 83735 ASSAY OF MAGNESIUM: CPT | Performed by: STUDENT IN AN ORGANIZED HEALTH CARE EDUCATION/TRAINING PROGRAM

## 2024-09-02 ENCOUNTER — HOSPITAL ENCOUNTER (EMERGENCY)
Facility: HOSPITAL | Age: 59
Discharge: HOME | End: 2024-09-02
Attending: EMERGENCY MEDICINE
Payer: COMMERCIAL

## 2024-09-02 VITALS
DIASTOLIC BLOOD PRESSURE: 78 MMHG | SYSTOLIC BLOOD PRESSURE: 179 MMHG | BODY MASS INDEX: 43.32 KG/M2 | HEIGHT: 65 IN | WEIGHT: 260 LBS | RESPIRATION RATE: 17 BRPM | HEART RATE: 58 BPM | OXYGEN SATURATION: 99 % | TEMPERATURE: 98 F

## 2024-09-02 DIAGNOSIS — G44.309 POST-TRAUMATIC HEADACHE, NOT INTRACTABLE, UNSPECIFIED CHRONICITY PATTERN: Primary | ICD-10-CM
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
## 2024-09-02 PROCEDURE — 25000003 PHARM REV CODE 250: Performed by: EMERGENCY MEDICINE

## 2024-09-02 PROCEDURE — 99283 EMERGENCY DEPT VISIT LOW MDM: CPT

## 2024-09-02 RX ADMIN — IBUPROFEN 600 MG: 200 TABLET, FILM COATED ORAL at 01:09

## 2024-09-02 NOTE — DISCHARGE INSTRUCTIONS
Continue using ibuprofen for your Head discomfort.  Return to the ER for worsening pain, change in mental status, any concerns
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
## 2024-11-05 ENCOUNTER — HOSPITAL ENCOUNTER (OUTPATIENT)
Dept: RADIOLOGY | Facility: HOSPITAL | Age: 59
Discharge: HOME | End: 2024-11-05
Attending: STUDENT IN AN ORGANIZED HEALTH CARE EDUCATION/TRAINING PROGRAM
Payer: MEDICARE

## 2024-11-05 DIAGNOSIS — Z12.31 ENCOUNTER FOR SCREENING MAMMOGRAM FOR MALIGNANT NEOPLASM OF BREAST: ICD-10-CM

## 2024-11-05 PROCEDURE — 77067 SCR MAMMO BI INCL CAD: CPT | Mod: 26,,, | Performed by: RADIOLOGY

## 2024-11-05 PROCEDURE — 77063 BREAST TOMOSYNTHESIS BI: CPT | Mod: 26,,, | Performed by: RADIOLOGY

## 2024-11-05 PROCEDURE — 77067 SCR MAMMO BI INCL CAD: CPT | Mod: TC,PO
